# Patient Record
Sex: MALE | Race: WHITE | Employment: FULL TIME | URBAN - METROPOLITAN AREA
[De-identification: names, ages, dates, MRNs, and addresses within clinical notes are randomized per-mention and may not be internally consistent; named-entity substitution may affect disease eponyms.]

---

## 2021-07-15 ENCOUNTER — APPOINTMENT (OUTPATIENT)
Dept: NON INVASIVE DIAGNOSTICS | Age: 26
DRG: 287 | End: 2021-07-15
Attending: NURSE PRACTITIONER
Payer: COMMERCIAL

## 2021-07-15 ENCOUNTER — APPOINTMENT (OUTPATIENT)
Dept: CT IMAGING | Age: 26
DRG: 287 | End: 2021-07-15
Attending: EMERGENCY MEDICINE
Payer: COMMERCIAL

## 2021-07-15 ENCOUNTER — HOSPITAL ENCOUNTER (INPATIENT)
Age: 26
LOS: 1 days | Discharge: HOME | DRG: 287 | End: 2021-07-16
Attending: EMERGENCY MEDICINE | Admitting: INTERNAL MEDICINE
Payer: COMMERCIAL

## 2021-07-15 ENCOUNTER — APPOINTMENT (OUTPATIENT)
Dept: GENERAL RADIOLOGY | Age: 26
DRG: 287 | End: 2021-07-15
Attending: EMERGENCY MEDICINE
Payer: COMMERCIAL

## 2021-07-15 DIAGNOSIS — R07.9 CHEST PAIN, UNSPECIFIED TYPE: ICD-10-CM

## 2021-07-15 DIAGNOSIS — I21.4 NSTEMI (NON-ST ELEVATED MYOCARDIAL INFARCTION) (HCC): Primary | ICD-10-CM

## 2021-07-15 DIAGNOSIS — I40.1 ACUTE IDIOPATHIC MYOCARDITIS: ICD-10-CM

## 2021-07-15 PROBLEM — I10 HTN (HYPERTENSION): Status: ACTIVE | Noted: 2021-07-15

## 2021-07-15 PROBLEM — R51.9 HEAD ACHE: Status: ACTIVE | Noted: 2021-07-15

## 2021-07-15 PROBLEM — R77.8 ELEVATED TROPONIN: Status: ACTIVE | Noted: 2021-07-15

## 2021-07-15 LAB
ALBUMIN SERPL-MCNC: 3.8 G/DL (ref 3.5–5)
ALBUMIN/GLOB SERPL: 1.2 {RATIO} (ref 1.2–3.5)
ALP SERPL-CCNC: 56 U/L (ref 50–136)
ALT SERPL-CCNC: 51 U/L (ref 12–65)
ANION GAP SERPL CALC-SCNC: 4 MMOL/L (ref 7–16)
APTT PPP: 28.6 SEC (ref 24.1–35.1)
AST SERPL-CCNC: 36 U/L (ref 15–37)
ATRIAL RATE: 75 BPM
ATRIAL RATE: 86 BPM
BASOPHILS # BLD: 0 K/UL (ref 0–0.2)
BASOPHILS NFR BLD: 0 % (ref 0–2)
BILIRUB SERPL-MCNC: 0.9 MG/DL (ref 0.2–1.1)
BUN SERPL-MCNC: 18 MG/DL (ref 6–23)
CALCIUM SERPL-MCNC: 8.6 MG/DL (ref 8.3–10.4)
CALCULATED P AXIS, ECG09: 23 DEGREES
CALCULATED P AXIS, ECG09: 51 DEGREES
CALCULATED R AXIS, ECG10: 58 DEGREES
CALCULATED R AXIS, ECG10: 70 DEGREES
CALCULATED T AXIS, ECG11: 18 DEGREES
CALCULATED T AXIS, ECG11: 36 DEGREES
CHLORIDE SERPL-SCNC: 105 MMOL/L (ref 98–107)
CK SERPL-CCNC: 301 U/L (ref 21–215)
CO2 SERPL-SCNC: 33 MMOL/L (ref 21–32)
COVID-19 RAPID TEST, COVR: NOT DETECTED
CREAT SERPL-MCNC: 1.06 MG/DL (ref 0.8–1.5)
CRP SERPL HS-MCNC: 8.2 MG/L
D DIMER PPP FEU-MCNC: 0.38 UG/ML(FEU)
DIAGNOSIS, 93000: NORMAL
DIAGNOSIS, 93000: NORMAL
DIFFERENTIAL METHOD BLD: ABNORMAL
EOSINOPHIL # BLD: 0 K/UL (ref 0–0.8)
EOSINOPHIL NFR BLD: 1 % (ref 0.5–7.8)
ERYTHROCYTE [DISTWIDTH] IN BLOOD BY AUTOMATED COUNT: 12.1 % (ref 11.9–14.6)
ERYTHROCYTE [SEDIMENTATION RATE] IN BLOOD: 4 MM/HR (ref 0–15)
GLOBULIN SER CALC-MCNC: 3.3 G/DL (ref 2.3–3.5)
GLUCOSE SERPL-MCNC: 102 MG/DL (ref 65–100)
HCT VFR BLD AUTO: 41.1 % (ref 41.1–50.3)
HGB BLD-MCNC: 15 G/DL (ref 13.6–17.2)
IMM GRANULOCYTES # BLD AUTO: 0 K/UL (ref 0–0.5)
IMM GRANULOCYTES NFR BLD AUTO: 0 % (ref 0–5)
INR PPP: 1.1
LIPASE SERPL-CCNC: 119 U/L (ref 73–393)
LYMPHOCYTES # BLD: 1.2 K/UL (ref 0.5–4.6)
LYMPHOCYTES NFR BLD: 23 % (ref 13–44)
MCH RBC QN AUTO: 32.1 PG (ref 26.1–32.9)
MCHC RBC AUTO-ENTMCNC: 36.4 G/DL (ref 31.4–35)
MCV RBC AUTO: 88.2 FL (ref 79.6–97.8)
MONOCYTES # BLD: 1 K/UL (ref 0.1–1.3)
MONOCYTES NFR BLD: 20 % (ref 4–12)
NEUTS SEG # BLD: 3 K/UL (ref 1.7–8.2)
NEUTS SEG NFR BLD: 56 % (ref 43–78)
NRBC # BLD: 0 K/UL (ref 0–0.2)
P-R INTERVAL, ECG05: 144 MS
P-R INTERVAL, ECG05: 160 MS
PLATELET # BLD AUTO: 160 K/UL (ref 150–450)
PMV BLD AUTO: 9.5 FL (ref 9.4–12.3)
POTASSIUM SERPL-SCNC: 3.8 MMOL/L (ref 3.5–5.1)
PROT SERPL-MCNC: 7.1 G/DL (ref 6.3–8.2)
PROTHROMBIN TIME: 14.9 SEC (ref 12.5–14.7)
Q-T INTERVAL, ECG07: 338 MS
Q-T INTERVAL, ECG07: 356 MS
QRS DURATION, ECG06: 92 MS
QRS DURATION, ECG06: 96 MS
QTC CALCULATION (BEZET), ECG08: 397 MS
QTC CALCULATION (BEZET), ECG08: 404 MS
RBC # BLD AUTO: 4.66 M/UL (ref 4.23–5.6)
SARS-COV-2, COV2: NORMAL
SODIUM SERPL-SCNC: 142 MMOL/L (ref 136–145)
SOURCE, COVRS: NORMAL
TROPONIN-HIGH SENSITIVITY: 3943 PG/ML (ref 0–14)
TROPONIN-HIGH SENSITIVITY: 6519.9 PG/ML (ref 0–14)
TROPONIN-HIGH SENSITIVITY: 7050.7 PG/ML (ref 0–14)
VENTRICULAR RATE, ECG03: 75 BPM
VENTRICULAR RATE, ECG03: 86 BPM
WBC # BLD AUTO: 5.7 K/UL (ref 4.3–11.1)

## 2021-07-15 PROCEDURE — B2111ZZ FLUOROSCOPY OF MULTIPLE CORONARY ARTERIES USING LOW OSMOLAR CONTRAST: ICD-10-PCS | Performed by: INTERNAL MEDICINE

## 2021-07-15 PROCEDURE — 93458 L HRT ARTERY/VENTRICLE ANGIO: CPT | Performed by: INTERNAL MEDICINE

## 2021-07-15 PROCEDURE — 85379 FIBRIN DEGRADATION QUANT: CPT

## 2021-07-15 PROCEDURE — 99152 MOD SED SAME PHYS/QHP 5/>YRS: CPT | Performed by: INTERNAL MEDICINE

## 2021-07-15 PROCEDURE — 76210000027 HC REC RM PH II 3.5 TO 4 HR: Performed by: INTERNAL MEDICINE

## 2021-07-15 PROCEDURE — 86141 C-REACTIVE PROTEIN HS: CPT

## 2021-07-15 PROCEDURE — 77030016699 HC CATH ANGI DX INFN1 CARD -A: Performed by: INTERNAL MEDICINE

## 2021-07-15 PROCEDURE — 99223 1ST HOSP IP/OBS HIGH 75: CPT | Performed by: INTERNAL MEDICINE

## 2021-07-15 PROCEDURE — 74011250636 HC RX REV CODE- 250/636: Performed by: NURSE PRACTITIONER

## 2021-07-15 PROCEDURE — 4A023N7 MEASUREMENT OF CARDIAC SAMPLING AND PRESSURE, LEFT HEART, PERCUTANEOUS APPROACH: ICD-10-PCS | Performed by: INTERNAL MEDICINE

## 2021-07-15 PROCEDURE — 82550 ASSAY OF CK (CPK): CPT

## 2021-07-15 PROCEDURE — 99285 EMERGENCY DEPT VISIT HI MDM: CPT

## 2021-07-15 PROCEDURE — 74011250637 HC RX REV CODE- 250/637: Performed by: NURSE PRACTITIONER

## 2021-07-15 PROCEDURE — C1894 INTRO/SHEATH, NON-LASER: HCPCS | Performed by: INTERNAL MEDICINE

## 2021-07-15 PROCEDURE — 74011000636 HC RX REV CODE- 636: Performed by: INTERNAL MEDICINE

## 2021-07-15 PROCEDURE — 71046 X-RAY EXAM CHEST 2 VIEWS: CPT

## 2021-07-15 PROCEDURE — B2151ZZ FLUOROSCOPY OF LEFT HEART USING LOW OSMOLAR CONTRAST: ICD-10-PCS | Performed by: INTERNAL MEDICINE

## 2021-07-15 PROCEDURE — 80053 COMPREHEN METABOLIC PANEL: CPT

## 2021-07-15 PROCEDURE — 85730 THROMBOPLASTIN TIME PARTIAL: CPT

## 2021-07-15 PROCEDURE — 77030015766: Performed by: INTERNAL MEDICINE

## 2021-07-15 PROCEDURE — 74011000250 HC RX REV CODE- 250: Performed by: INTERNAL MEDICINE

## 2021-07-15 PROCEDURE — 93005 ELECTROCARDIOGRAM TRACING: CPT | Performed by: EMERGENCY MEDICINE

## 2021-07-15 PROCEDURE — 87635 SARS-COV-2 COVID-19 AMP PRB: CPT

## 2021-07-15 PROCEDURE — C1769 GUIDE WIRE: HCPCS | Performed by: INTERNAL MEDICINE

## 2021-07-15 PROCEDURE — 70450 CT HEAD/BRAIN W/O DYE: CPT

## 2021-07-15 PROCEDURE — 84484 ASSAY OF TROPONIN QUANT: CPT

## 2021-07-15 PROCEDURE — 74011250636 HC RX REV CODE- 250/636: Performed by: INTERNAL MEDICINE

## 2021-07-15 PROCEDURE — 85610 PROTHROMBIN TIME: CPT

## 2021-07-15 PROCEDURE — 74011250637 HC RX REV CODE- 250/637: Performed by: EMERGENCY MEDICINE

## 2021-07-15 PROCEDURE — 85652 RBC SED RATE AUTOMATED: CPT

## 2021-07-15 PROCEDURE — 83690 ASSAY OF LIPASE: CPT

## 2021-07-15 PROCEDURE — 65660000000 HC RM CCU STEPDOWN

## 2021-07-15 PROCEDURE — U0005 INFEC AGEN DETEC AMPLI PROBE: HCPCS

## 2021-07-15 PROCEDURE — 85025 COMPLETE CBC W/AUTO DIFF WBC: CPT

## 2021-07-15 PROCEDURE — C8929 TTE W OR WO FOL WCON,DOPPLER: HCPCS

## 2021-07-15 PROCEDURE — 74011000250 HC RX REV CODE- 250: Performed by: EMERGENCY MEDICINE

## 2021-07-15 RX ORDER — METOPROLOL TARTRATE 25 MG/1
25 TABLET, FILM COATED ORAL EVERY 12 HOURS
Status: DISCONTINUED | OUTPATIENT
Start: 2021-07-15 | End: 2021-07-16 | Stop reason: HOSPADM

## 2021-07-15 RX ORDER — IBUPROFEN 600 MG/1
600 TABLET ORAL 3 TIMES DAILY
Status: DISCONTINUED | OUTPATIENT
Start: 2021-07-15 | End: 2021-07-16 | Stop reason: HOSPADM

## 2021-07-15 RX ORDER — SODIUM CHLORIDE 0.9 % (FLUSH) 0.9 %
5-40 SYRINGE (ML) INJECTION AS NEEDED
Status: DISCONTINUED | OUTPATIENT
Start: 2021-07-15 | End: 2021-07-16 | Stop reason: HOSPADM

## 2021-07-15 RX ORDER — MORPHINE SULFATE 4 MG/ML
4 INJECTION INTRAVENOUS ONCE
Status: DISCONTINUED | OUTPATIENT
Start: 2021-07-15 | End: 2021-07-15

## 2021-07-15 RX ORDER — ONDANSETRON 2 MG/ML
4 INJECTION INTRAMUSCULAR; INTRAVENOUS
Status: DISCONTINUED | OUTPATIENT
Start: 2021-07-15 | End: 2021-07-15

## 2021-07-15 RX ORDER — ACETAMINOPHEN 500 MG
1000 TABLET ORAL
Status: DISCONTINUED | OUTPATIENT
Start: 2021-07-15 | End: 2021-07-15

## 2021-07-15 RX ORDER — LIDOCAINE HYDROCHLORIDE 10 MG/ML
INJECTION INFILTRATION; PERINEURAL AS NEEDED
Status: DISCONTINUED | OUTPATIENT
Start: 2021-07-15 | End: 2021-07-15 | Stop reason: HOSPADM

## 2021-07-15 RX ORDER — GUAIFENESIN 100 MG/5ML
81 LIQUID (ML) ORAL DAILY
Status: DISCONTINUED | OUTPATIENT
Start: 2021-07-15 | End: 2021-07-16 | Stop reason: HOSPADM

## 2021-07-15 RX ORDER — SODIUM CHLORIDE 0.9 % (FLUSH) 0.9 %
5-40 SYRINGE (ML) INJECTION EVERY 8 HOURS
Status: DISCONTINUED | OUTPATIENT
Start: 2021-07-15 | End: 2021-07-16 | Stop reason: HOSPADM

## 2021-07-15 RX ORDER — MORPHINE SULFATE 2 MG/ML
2 INJECTION, SOLUTION INTRAMUSCULAR; INTRAVENOUS
Status: DISCONTINUED | OUTPATIENT
Start: 2021-07-15 | End: 2021-07-16 | Stop reason: HOSPADM

## 2021-07-15 RX ORDER — GUAIFENESIN 100 MG/5ML
324 LIQUID (ML) ORAL
Status: COMPLETED | OUTPATIENT
Start: 2021-07-15 | End: 2021-07-15

## 2021-07-15 RX ORDER — MAG HYDROX/ALUMINUM HYD/SIMETH 200-200-20
30 SUSPENSION, ORAL (FINAL DOSE FORM) ORAL
Status: COMPLETED | OUTPATIENT
Start: 2021-07-15 | End: 2021-07-15

## 2021-07-15 RX ORDER — NITROGLYCERIN 0.4 MG/1
0.4 TABLET SUBLINGUAL
Status: DISCONTINUED | OUTPATIENT
Start: 2021-07-15 | End: 2021-07-16 | Stop reason: HOSPADM

## 2021-07-15 RX ORDER — HEPARIN SODIUM 5000 [USP'U]/ML
60 INJECTION, SOLUTION INTRAVENOUS; SUBCUTANEOUS ONCE
Status: DISCONTINUED | OUTPATIENT
Start: 2021-07-15 | End: 2021-07-15

## 2021-07-15 RX ORDER — HEPARIN SODIUM 5000 [USP'U]/100ML
12-25 INJECTION, SOLUTION INTRAVENOUS
Status: DISCONTINUED | OUTPATIENT
Start: 2021-07-15 | End: 2021-07-15

## 2021-07-15 RX ORDER — LIDOCAINE HYDROCHLORIDE 20 MG/ML
15 SOLUTION OROPHARYNGEAL
Status: COMPLETED | OUTPATIENT
Start: 2021-07-15 | End: 2021-07-15

## 2021-07-15 RX ORDER — MIDAZOLAM HYDROCHLORIDE 1 MG/ML
INJECTION, SOLUTION INTRAMUSCULAR; INTRAVENOUS AS NEEDED
Status: DISCONTINUED | OUTPATIENT
Start: 2021-07-15 | End: 2021-07-15 | Stop reason: HOSPADM

## 2021-07-15 RX ADMIN — ASPIRIN 324 MG: 81 TABLET, CHEWABLE ORAL at 09:11

## 2021-07-15 RX ADMIN — LIDOCAINE HYDROCHLORIDE 15 ML: 20 SOLUTION ORAL; TOPICAL at 09:11

## 2021-07-15 RX ADMIN — ALUMINUM HYDROXIDE, MAGNESIUM HYDROXIDE, AND SIMETHICONE 30 ML: 200; 200; 20 SUSPENSION ORAL at 09:11

## 2021-07-15 RX ADMIN — Medication 10 ML: at 20:27

## 2021-07-15 RX ADMIN — IBUPROFEN 600 MG: 600 TABLET ORAL at 19:10

## 2021-07-15 RX ADMIN — IBUPROFEN 600 MG: 600 TABLET ORAL at 23:26

## 2021-07-15 RX ADMIN — MORPHINE SULFATE 2 MG: 2 INJECTION, SOLUTION INTRAMUSCULAR; INTRAVENOUS at 20:24

## 2021-07-15 RX ADMIN — PERFLUTREN 1 ML: 6.52 INJECTION, SUSPENSION INTRAVENOUS at 13:57

## 2021-07-15 RX ADMIN — METOPROLOL TARTRATE 25 MG: 25 TABLET, FILM COATED ORAL at 20:24

## 2021-07-15 RX ADMIN — Medication 10 ML: at 21:19

## 2021-07-15 RX ADMIN — Medication 10 ML: at 15:42

## 2021-07-15 NOTE — PROGRESS NOTES
TRANSFER - IN REPORT:    Verbal report received from Laurence RN(name) on [facility]  being received from PAR(unit) for routine progression of care      Report consisted of patients Situation, Background, Assessment and   Recommendations(SBAR). Information from the following report(s) SBAR, Procedure Summary and Cardiac Rhythm . was reviewed with the receiving nurse. Opportunity for questions and clarification was provided. Assessment completed upon patients arrival to unit and care assumed. Telemetry monitor applied, bed low and locked, side rails x2. Patient has been oriented to room and instructed to use call light for assistance. Dual skin assessment completed with secondary RN which reveals the following: sacrum and heels intact and free of redness. Right radial cath site noted. C/D/I with no hematoma present. Tegaderm and gauze intact.

## 2021-07-15 NOTE — ROUTINE PROCESS
Bedside and Verbal shift change report given to self (oncoming nurse) by Wilder Rojas RN (offgoing nurse). Report included the following information SBAR, Kardex, ED Summary, Procedure Summary, MAR and Recent Results.

## 2021-07-15 NOTE — H&P
[facility] CARDIOLOGY History &Physical                 Primary Cardiologist: None    Primary Care Physician: PCP    Admitting Physician: Dr Pam Klein:     Patient is a 23 y.o. male with a hx of significant medical history who comes to the emergency department with complaints of of severe headache and chest pain started last night. The chest pain described as sharp pain sometimes moves all the way up to his head and the back down into his chest.  At times this pain will go to his jaw and woke him up around 7 AM this morning. Patient was having a dream where he had significant discomfort as well. Currently denies shortness of breath, cough, hemoptysis, fever, chills, abdominal pain, dizziness, weakness, diaphoresis, flank pain, back pain, history of CAD, history of PE DVT, smoking cigarettes, or illicit drug or alcohol use. Patient also I believe work early last night from a severe migraine which would not go away. He has never had invasive cardiac work-up in the past and for set of high since troponin enzymes were close to 4000. Review of other lab work showed elevated CO2 of 33 glucose 102 creatinine 1.06, no other significant abnormalities. Further lab work was ordered. Patient heparin drip was held in the ER to be sent to CT to rule out significant head bleed. Recent Cardiac Synopsis w/ Labs  LHC/NST: Pending  Echo: Pending  EKG: Normal sinus rhythm with bi atrial P waves  PPM Interrogation:   No past medical history on file. No past surgical history on file. No Known Allergies  Social History     Tobacco Use    Smoking status: Not on file   Substance Use Topics    Alcohol use: Not on file      FH: No family history on file. Review of Systems   Constitutional: Positive for malaise/fatigue. Cardiovascular: Positive for chest pain. Respiratory: Negative for shortness of breath. Gastrointestinal: Positive for nausea. Negative for vomiting. Neurological: Positive for headaches. Objective:       Visit Vitals  BP (!) 150/88   Pulse 99   Temp 98.2 °F (36.8 °C)   Resp 16   Ht 6' (1.829 m)   Wt 104.3 kg (230 lb)   SpO2 98%   BMI 31.19 kg/m²       Last 3 Recorded Weights in this Encounter    07/15/21 0858   Weight: 104.3 kg (230 lb)       No intake/output data recorded. No intake/output data recorded. Physical Exam:  General: Well Developed, Well Nourished, No Acute Distress  HEENT: pupils equal and round, no abnormalities noted  Neck: supple, no JVD, no carotid bruits  Heart: S1S2 with RRR without murmurs or gallops  Lungs: Clear throughout auscultation bilaterally without adventitious sounds  Abd: soft, nontender, nondistended, with good bowel sounds  Ext: warm, no edema, calves supple/nontender, pulses 2+ bilaterally  Skin: warm and dry  Psychiatric: Normal mood and affect  Neurologic: Alert and oriented X 3    Data Review:   Recent Labs     07/15/21  0900      K 3.8   BUN 18   CREA 1.06   *   WBC 5.7   HGB 15.0   HCT 41.1          CXR Results  (Last 48 hours)               07/15/21 0954  XR CHEST PA LAT Final result    Impression:  No acute findings in the chest           Narrative:  EXAM: XR CHEST PA LAT       INDICATION: CP.       COMPARISON: None. PA and lateral views of the chest were obtained. FINDINGS:    Support Devices:   *  None. Cardiac Silhouette: Within normal limits in size. Mediastinum: Normal mediastinal contours. Lungs: No airspace consolidation. No pneumothorax or sizable pleural effusion. Upper Abdomen: Normal.       Miscellaneous: No lytic or blastic lesions. Assessment/Plan:   Principal Problem:    Chest pain (7/15/2021) we will plan for left heart cath and start heparin drip after patient has had a CT of the head. Concerning for acute myocardial infarction versus myocarditis.   Will start heparin drip, ASA, beta-blocker, daily BMP, trend troponins, CK, sed rate, and check echocardiogram.  Further recommendations pending results of left heart cath and attending recommendations. Will get lipid panel in the morning and check UDS. Active Problems:    Elevated troponin (7/15/2021) see above plan on heparin drip after rule outs of head CT with complaints of headache and slightly hypertensive. Head ache (7/15/2021) as noted above plan CT, Tylenol for pain. HTN (hypertension) (7/15/2021) likely secondary to chest pain and headache. We will continue to trend. Further recommendations pending clinical course and attending recommendations.             Shala Diop NP  7/15/2021  10:54 AM

## 2021-07-15 NOTE — ED TRIAGE NOTES
Pt arrives via pov from home for complaint of chest pain that started at 0700. Pt states CP woke him up from sleep. Pt confirms nausea without vomiting, SOB. Pt denies diaphoresis, lightheadedness or dizziness. Pt with no cardiac history.

## 2021-07-15 NOTE — PROGRESS NOTES
TRANSFER - IN REPORT:    Verbal report received from [employer] on [employer]  being received from Cath Lab for routine progression of care      Report consisted of patients Situation, Background, Assessment and   Recommendations(SBAR). Information from the following report(s) SBAR was reviewed with the receiving nurse. Opportunity for questions and clarification was provided. Assessment completed upon patients arrival to unit and care assumed.

## 2021-07-15 NOTE — Clinical Note
TRANSFER - OUT REPORT:     Verbal report given to: [facility] RN. Report consisted of patient's Situation, Background, Assessment and   Recommendations(SBAR). Opportunity for questions and clarification was provided. Patient transported with a Cardiac Cath Tech / Patient Care Tech. Patient transported to: [address] [place] [address].

## 2021-07-15 NOTE — PROGRESS NOTES
Pt complain on numbness and tingly feeling in right arm. Ana Granda made aware and assessed patient. NIH 0. Will continue to monitor patient closely.

## 2021-07-15 NOTE — Clinical Note
TRANSFER - IN REPORT:     Verbal report received from: CPRU RN. Report consisted of patient's Situation, Background, Assessment and   Recommendations(SBAR). Opportunity for questions and clarification was provided. Assessment completed upon patient's arrival to unit and care assumed. Patient transported with a Cardiac Cath Tech / Patient Care Tech.

## 2021-07-15 NOTE — PROGRESS NOTES
TRANSFER - OUT REPORT:  C by Dr. Bart Byrnes   Right radial access  No interventions  Right wrist TR band with 12ml  Versed 2mg IV  Access site soft, clean, dry, and intact; with no signs of bleeding or hematoma  Pt. Tolerated procedure    Verbal report given to Laurence(name) petty Erickson  being transferred to [facility](unit) for routine progression of care       Report consisted of patients Situation, Background, Assessment and   Recommendations(SBAR). Information from the following report(s) Procedure Summary and MAR was reviewed with the receiving nurse. Lines:   Peripheral IV Left Antecubital (Active)       Peripheral IV Right Antecubital (Active)        Opportunity for questions and clarification was provided.       Patient transported with:   Registered Nurse

## 2021-07-15 NOTE — ED PROVIDER NOTES
58-year-old male with no pertinent past medical history presents with complaint of sharp substernal chest pressure with radiation to right and left chest as well as jaw that woke him up around 7 AM.  Patient states that he was [place] a dream where there was pressure on his chest and woke up with significant discomfort\". Denies shortness of breath, cough, hemoptysis, fever, chills, abdominal pain, dizziness, weakness, diaphoresis, flank pain. Denies history of CAD. Denies history of PE or DVT. Denies smoking cigarettes. Denies illicit drug use or alcohol use. States that he had to leave work last night due to migraine. The history is provided by the patient. No  was used. Chest Pain (Angina)   This is a new problem. The current episode started 1 to 2 hours ago. The problem has not changed since onset. The problem occurs rarely. The pain is associated with normal activity. The pain is present in the substernal region. The pain is at a severity of 4/10. The pain is moderate. The quality of the pain is described as pressure-like and burning. Pertinent negatives include no abdominal pain, no back pain, no cough, no diaphoresis, no dizziness, no exertional chest pressure, no fever, no headaches, no nausea, no palpitations, no shortness of breath, no vomiting and no weakness. He has tried nothing for the symptoms. The treatment provided no relief. Risk factors include male gender. No past medical history on file. No past surgical history on file. No family history on file.     Social History     Socioeconomic History    Marital status: LIFE PARTNER     Spouse name: Not on file    Number of children: Not on file    Years of education: Not on file    Highest education level: Not on file   Occupational History    Not on file   Tobacco Use    Smoking status: Not on file   Substance and Sexual Activity    Alcohol use: Not on file    Drug use: Not on file    Sexual activity: Not on file   Other Topics Concern    Not on file   Social History Narrative    Not on file     Social Determinants of Health     Financial Resource Strain:     Difficulty of Paying Living Expenses:    Food Insecurity:     Worried About Running Out of Food in the Last Year:     [address] in the Last Year:    Transportation Needs:     Lack of Transportation (Medical):  Lack of Transportation (Non-Medical):    Physical Activity:     Days of Exercise per Week:     Minutes of Exercise per Session:    Stress:     Feeling of Stress :    Social Connections:     Frequency of Communication with Friends and Family:     Frequency of Social Gatherings with Friends and Family:     Attends [employer] Services:     Active Member of Clubs or Organizations:     Attends Club or Organization Meetings:     Marital Status:    Intimate Partner Violence:     Fear of Current or Ex-Partner:     Emotionally Abused:     Physically Abused:     Sexually Abused: ALLERGIES: Patient has no known allergies. Review of Systems   Constitutional: Negative for chills, diaphoresis, fatigue and fever. HENT: Negative for congestion and rhinorrhea. Respiratory: Negative for cough and shortness of breath. Cardiovascular: Positive for chest pain. Negative for palpitations. Gastrointestinal: Negative for abdominal pain, diarrhea, nausea and vomiting. Genitourinary: Negative for dysuria, flank pain and hematuria. Musculoskeletal: Negative for back pain and myalgias. Skin: Negative for color change and rash. Neurological: Negative for dizziness, syncope, weakness, light-headedness and headaches. Vitals:    07/15/21 0922 07/15/21 0959 07/15/21 1001 07/15/21 1002   BP:  137/75 (!) 141/86 (!) 147/68   Pulse:  92 84 80   Resp:       Temp:       SpO2: 97% 95% 96% 96%   Weight:       Height:                Physical Exam  Vitals and nursing note reviewed.    Constitutional:       Appearance: He is well-developed. HENT:      Head: Normocephalic. Eyes:      Extraocular Movements: Extraocular movements intact. Pupils: Pupils are equal, round, and reactive to light. Comments: No nystagmus. Cardiovascular:      Rate and Rhythm: Normal rate and regular rhythm. Heart sounds: Normal heart sounds. Comments: Pulses 2+ and equal throughout. Pulmonary:      Effort: Pulmonary effort is normal.      Breath sounds: Normal breath sounds. Comments: CTAB. Abdominal:      General: Bowel sounds are normal.      Palpations: Abdomen is soft. Tenderness: There is no abdominal tenderness. Comments: Soft, NTND. No rebound or guarding. No CVAT. Musculoskeletal:         General: Normal range of motion. Cervical back: Normal range of motion. Comments: No LE edema. No calf TTP. Skin:     General: Skin is warm. Capillary Refill: Capillary refill takes less than 2 seconds. Findings: No erythema. Neurological:      General: No focal deficit present. Mental Status: He is alert and oriented to person, place, and time. Motor: No weakness. Comments: No focal deficits. Strength 5 out of 5 throughout. MDM  Number of Diagnoses or Management Options  Chest pain, unspecified type: new and requires workup  NSTEMI (non-ST elevated myocardial infarction) [facility]): new and requires workup  Diagnosis management comments: GI cocktail, aspirin ordered. EKG with normal sinus rhythm. No ischemic changes noted. Basic labs and chest x-ray pending.  ===================================================  Initial troponin noted to be elevated at 3943. Cardiology consulted. Coags added on. Chest x-ray with no wide mediastinum. Pulses 2+ and equal throughout. Blood pressures between bilateral UEs equal.  Spoke w/ Dr. Rachael Zhou who recommends starting heparin at this time. States that they will see patient at bedside.   Patient does state that he did have significant headache this morning. Will obtain CT head prior to administration of heparin. Cardiology to take to Cath Lab after returning from CT. Amount and/or Complexity of Data Reviewed  Clinical lab tests: ordered and reviewed  Tests in the radiology section of CPT®: ordered and reviewed  Tests in the medicine section of CPT®: ordered and reviewed  Review and summarize past medical records: yes  Discuss the patient with other providers: yes  Independent visualization of images, tracings, or specimens: yes    Risk of Complications, Morbidity, and/or Mortality  Presenting problems: high  Diagnostic procedures: high  Management options: high    Patient Progress  Patient progress: stable    ED Course as of Jul 15 1018   Thu Jul 15, 2021   0949 Troponin-High Sensitivity(!!): 3,943.0 [DF]   1017 CXR IMPRESSION:   No acute findings in the chest    [DF]      ED Course User Index  [DF] Mayra Faria MD       EKG    Date/Time: 7/15/2021 8:55 AM  Performed by: Mayra Faria MD  Authorized by: Mayra Faria MD     ECG reviewed by ED Physician in the absence of a cardiologist: yes    Previous ECG:     Previous ECG:  Unavailable  Rate:     ECG rate:  86    ECG rate assessment: normal    Rhythm:     Rhythm: sinus rhythm    Ectopy:     Ectopy: none    QRS:     QRS axis:  Normal    QRS intervals:  Normal  Conduction:     Conduction: normal    ST segments:     ST segments:  Normal  T waves:     T waves: normal      Critical Care  Performed by: Mayra Faria MD  Authorized by:  Mayra Faria MD     Critical care provider statement:     Critical care time (minutes):  35    Critical care time was exclusive of:  Separately billable procedures and treating other patients    Critical care was necessary to treat or prevent imminent or life-threatening deterioration of the following conditions:  Cardiac failure    Critical care was time spent personally by me on the following activities:  Blood draw for specimens, development of treatment plan with patient or surrogate, discussions with consultants, evaluation of patient's response to treatment, examination of patient, obtaining history from patient or surrogate, ordering and performing treatments and interventions, ordering and review of laboratory studies, ordering and review of radiographic studies, pulse oximetry, re-evaluation of patient's condition and review of old charts    I assumed direction of critical care for this patient from another provider in my specialty: yes          Results Include:    Recent Results (from the past 24 hour(s))   EKG, 12 LEAD, INITIAL    Collection Time: 07/15/21  8:50 AM   Result Value Ref Range    Ventricular Rate 86 BPM    Atrial Rate 86 BPM    P-R Interval 144 ms    QRS Duration 96 ms    Q-T Interval 338 ms    QTC Calculation (Bezet) 404 ms    Calculated P Axis 51 degrees    Calculated R Axis 70 degrees    Calculated T Axis 36 degrees    Diagnosis       !! AGE AND GENDER SPECIFIC ECG ANALYSIS !! Normal sinus rhythm  Normal ECG  No previous ECGs available     CBC WITH AUTOMATED DIFF    Collection Time: 07/15/21  9:00 AM   Result Value Ref Range    WBC 5.7 4.3 - 11.1 K/uL    RBC 4.66 4.23 - 5.6 M/uL    HGB 15.0 13.6 - 17.2 g/dL    HCT 41.1 41.1 - 50.3 %    MCV 88.2 79.6 - 97.8 FL    MCH 32.1 26.1 - 32.9 PG    MCHC 36.4 (H) 31.4 - 35.0 g/dL    RDW 12.1 11.9 - 14.6 %    PLATELET 118 790 - 547 K/uL    MPV 9.5 9.4 - 12.3 FL    ABSOLUTE NRBC 0.00 0.0 - 0.2 K/uL    DF AUTOMATED      NEUTROPHILS 56 43 - 78 %    LYMPHOCYTES 23 13 - 44 %    MONOCYTES 20 (H) 4.0 - 12.0 %    EOSINOPHILS 1 0.5 - 7.8 %    BASOPHILS 0 0.0 - 2.0 %    IMMATURE GRANULOCYTES 0 0.0 - 5.0 %    ABS. NEUTROPHILS 3.0 1.7 - 8.2 K/UL    ABS. LYMPHOCYTES 1.2 0.5 - 4.6 K/UL    ABS. MONOCYTES 1.0 0.1 - 1.3 K/UL    ABS. EOSINOPHILS 0.0 0.0 - 0.8 K/UL    ABS. BASOPHILS 0.0 0.0 - 0.2 K/UL    ABS. IMM.  GRANS. 0.0 0.0 - 0.5 K/UL   METABOLIC PANEL, COMPREHENSIVE    Collection Time: 07/15/21  9:00 AM   Result Value Ref Range    Sodium 142 136 - 145 mmol/L    Potassium 3.8 3.5 - 5.1 mmol/L    Chloride 105 98 - 107 mmol/L    CO2 33 (H) 21 - 32 mmol/L    Anion gap 4 (L) 7 - 16 mmol/L    Glucose 102 (H) 65 - 100 mg/dL    BUN 18 6 - 23 MG/DL    Creatinine 1.06 0.8 - 1.5 MG/DL    GFR est AA >60 >60 ml/min/1.73m2    GFR est non-AA >60 >60 ml/min/1.73m2    Calcium 8.6 8.3 - 10.4 MG/DL    Bilirubin, total 0.9 0.2 - 1.1 MG/DL    ALT (SGPT) 51 12 - 65 U/L    AST (SGOT) 36 15 - 37 U/L    Alk. phosphatase 56 50 - 136 U/L    Protein, total 7.1 6.3 - 8.2 g/dL    Albumin 3.8 3.5 - 5.0 g/dL    Globulin 3.3 2.3 - 3.5 g/dL    A-G Ratio 1.2 1.2 - 3.5     LIPASE    Collection Time: 07/15/21  9:00 AM   Result Value Ref Range    Lipase 119 73 - 393 U/L   TROPONIN-HIGH SENSITIVITY    Collection Time: 07/15/21  9:00 AM   Result Value Ref Range    Troponin-High Sensitivity 3,943.0 (HH) 0 - 14 pg/mL           Khurram Allen MD; 7/15/2021 @8:55 AM Voice dictation software was used during the making of this note. This software is not perfect and grammatical and other typographical errors may be present.   This note has not been proofread for errors.  ===================================================================

## 2021-07-15 NOTE — Clinical Note
Contrast Dose Calculator:   Patient's age: 23.   Patient's sex: Male. Patient weight (kg) = 104.3. Creatinine level (mg/dL) = 1.06.   Creatinine clearance (mL/min): 157. Contrast concentration (mg/mL) = 370. MACD = 300 mL. Max Contrast dose per Creatinine Cl calculator = 300 mL.

## 2021-07-16 VITALS
TEMPERATURE: 97.9 F | RESPIRATION RATE: 18 BRPM | HEIGHT: 72 IN | OXYGEN SATURATION: 98 % | SYSTOLIC BLOOD PRESSURE: 123 MMHG | DIASTOLIC BLOOD PRESSURE: 80 MMHG | BODY MASS INDEX: 31.14 KG/M2 | WEIGHT: 229.9 LBS | HEART RATE: 86 BPM

## 2021-07-16 LAB
AMPHET UR QL SCN: NEGATIVE
ANION GAP SERPL CALC-SCNC: 2 MMOL/L (ref 7–16)
BARBITURATES UR QL SCN: NEGATIVE
BASOPHILS # BLD: 0 K/UL (ref 0–0.2)
BASOPHILS NFR BLD: 1 % (ref 0–2)
BENZODIAZ UR QL: POSITIVE
BUN SERPL-MCNC: 14 MG/DL (ref 6–23)
CALCIUM SERPL-MCNC: 8.8 MG/DL (ref 8.3–10.4)
CANNABINOIDS UR QL SCN: NEGATIVE
CHLORIDE SERPL-SCNC: 105 MMOL/L (ref 98–107)
CHOLEST SERPL-MCNC: 199 MG/DL
CO2 SERPL-SCNC: 32 MMOL/L (ref 21–32)
COCAINE UR QL SCN: NEGATIVE
CREAT SERPL-MCNC: 0.99 MG/DL (ref 0.8–1.5)
DIFFERENTIAL METHOD BLD: ABNORMAL
ECHO AO ASC DIAM: 2.95 CM
ECHO AO ROOT DIAM: 2.82 CM
ECHO AV AREA PEAK VELOCITY: 2.74 CM2
ECHO AV AREA/BSA PEAK VELOCITY: 1.2 CM2/M2
ECHO AV PEAK GRADIENT: 7.3 MMHG
ECHO AV PEAK VELOCITY: 135.1 CM/S
ECHO LA MAJOR AXIS: 2.58 CM
ECHO LA MINOR AXIS: 1.14 CM
ECHO LV E' LATERAL VELOCITY: 13.57 CM/S
ECHO LV E' SEPTAL VELOCITY: 12.23 CM/S
ECHO LV INTERNAL DIMENSION DIASTOLIC: 4.97 CM (ref 4.2–5.9)
ECHO LV INTERNAL DIMENSION SYSTOLIC: 4.02 CM
ECHO LV IVSD: 1.09 CM (ref 0.6–1)
ECHO LV MASS 2D: 207.7 G (ref 88–224)
ECHO LV MASS INDEX 2D: 91.9 G/M2 (ref 49–115)
ECHO LV POSTERIOR WALL DIASTOLIC: 1.13 CM (ref 0.6–1)
ECHO LVOT DIAM: 2.18 CM
ECHO LVOT PEAK GRADIENT: 3.95 MMHG
ECHO LVOT PEAK VELOCITY: 99.32 CM/S
ECHO MV A VELOCITY: 54.57 CM/S
ECHO MV AREA PHT: 4.96 CM2
ECHO MV E DECELERATION TIME (DT): 152.84 MS
ECHO MV E VELOCITY: 69.79 CM/S
ECHO MV E/A RATIO: 1.28
ECHO MV E/E' LATERAL: 5.14
ECHO MV E/E' RATIO (AVERAGED): 5.42
ECHO MV E/E' SEPTAL: 5.71
ECHO MV PRESSURE HALF TIME (PHT): 44.32 MS
ECHO RV INTERNAL DIMENSION: 2.12 CM
ECHO RV TAPSE: 1.82 CM (ref 1.5–2)
EOSINOPHIL # BLD: 0.1 K/UL (ref 0–0.8)
EOSINOPHIL NFR BLD: 1 % (ref 0.5–7.8)
ERYTHROCYTE [DISTWIDTH] IN BLOOD BY AUTOMATED COUNT: 12.2 % (ref 11.9–14.6)
GLUCOSE SERPL-MCNC: 96 MG/DL (ref 65–100)
HCT VFR BLD AUTO: 45.7 % (ref 41.1–50.3)
HDLC SERPL-MCNC: 41 MG/DL (ref 40–60)
HDLC SERPL: 4.9 {RATIO}
HGB BLD-MCNC: 15.6 G/DL (ref 13.6–17.2)
IMM GRANULOCYTES # BLD AUTO: 0 K/UL (ref 0–0.5)
IMM GRANULOCYTES NFR BLD AUTO: 0 % (ref 0–5)
LDLC SERPL CALC-MCNC: 110.4 MG/DL
LYMPHOCYTES # BLD: 2 K/UL (ref 0.5–4.6)
LYMPHOCYTES NFR BLD: 30 % (ref 13–44)
MCH RBC QN AUTO: 31 PG (ref 26.1–32.9)
MCHC RBC AUTO-ENTMCNC: 34.1 G/DL (ref 31.4–35)
MCV RBC AUTO: 90.7 FL (ref 79.6–97.8)
METHADONE UR QL: NEGATIVE
MONOCYTES # BLD: 1 K/UL (ref 0.1–1.3)
MONOCYTES NFR BLD: 15 % (ref 4–12)
NEUTS SEG # BLD: 3.4 K/UL (ref 1.7–8.2)
NEUTS SEG NFR BLD: 53 % (ref 43–78)
NRBC # BLD: 0 K/UL (ref 0–0.2)
OPIATES UR QL: POSITIVE
PCP UR QL: NEGATIVE
PLATELET # BLD AUTO: 144 K/UL (ref 150–450)
PMV BLD AUTO: 10.3 FL (ref 9.4–12.3)
POTASSIUM SERPL-SCNC: 4.4 MMOL/L (ref 3.5–5.1)
RBC # BLD AUTO: 5.04 M/UL (ref 4.23–5.6)
SARS-COV-2, COV2: NOT DETECTED
SODIUM SERPL-SCNC: 139 MMOL/L (ref 138–145)
SPECIMEN SOURCE, FCOV2M: NORMAL
TRIGL SERPL-MCNC: 238 MG/DL (ref 35–150)
VLDLC SERPL CALC-MCNC: 47.6 MG/DL (ref 6–23)
WBC # BLD AUTO: 6.5 K/UL (ref 4.3–11.1)

## 2021-07-16 PROCEDURE — 74011250637 HC RX REV CODE- 250/637: Performed by: NURSE PRACTITIONER

## 2021-07-16 PROCEDURE — 80061 LIPID PANEL: CPT

## 2021-07-16 PROCEDURE — 99232 SBSQ HOSP IP/OBS MODERATE 35: CPT | Performed by: INTERNAL MEDICINE

## 2021-07-16 PROCEDURE — 85025 COMPLETE CBC W/AUTO DIFF WBC: CPT

## 2021-07-16 PROCEDURE — 80048 BASIC METABOLIC PNL TOTAL CA: CPT

## 2021-07-16 PROCEDURE — 36415 COLL VENOUS BLD VENIPUNCTURE: CPT

## 2021-07-16 PROCEDURE — 2709999900 HC NON-CHARGEABLE SUPPLY

## 2021-07-16 PROCEDURE — 80307 DRUG TEST PRSMV CHEM ANLYZR: CPT

## 2021-07-16 RX ORDER — METOPROLOL TARTRATE 25 MG/1
25 TABLET, FILM COATED ORAL EVERY 12 HOURS
Qty: 60 TABLET | Refills: 3 | Status: SHIPPED | OUTPATIENT
Start: 2021-07-16 | End: 2022-04-29

## 2021-07-16 RX ORDER — IBUPROFEN 600 MG/1
600 TABLET ORAL 3 TIMES DAILY
Qty: 21 TABLET | Refills: 0 | Status: SHIPPED | OUTPATIENT
Start: 2021-07-16 | End: 2021-07-23

## 2021-07-16 RX ADMIN — IBUPROFEN 600 MG: 600 TABLET ORAL at 09:32

## 2021-07-16 RX ADMIN — Medication 5 ML: at 05:50

## 2021-07-16 RX ADMIN — METOPROLOL TARTRATE 25 MG: 25 TABLET, FILM COATED ORAL at 09:32

## 2021-07-16 RX ADMIN — ASPIRIN 81 MG: 81 TABLET, CHEWABLE ORAL at 09:32

## 2021-07-16 NOTE — PROGRESS NOTES
am  7/16/2021 6:33 AM    Admit Date: 7/15/2021    Admit Diagnosis: Elevated troponin [R77.8]; Head ache [R51.9];HTN (hypertension) [I10]      Subjective:    Patient : Patient admitted with acute presumed viral myocarditis his left heart cath showed normal coronaries and normal LV function he has an echo pending this morning if his echo shows normal EF which I suspected well and no evidence of pericardial effusion that he can go home on Motrin if there is evidence of a pericardial effusion or pericardial involvement will consider adding colchicine but his symptoms are vastly improved and I anticipate discharge today after his echo    Objective:      Visit Vitals  /63 (BP 1 Location: Left upper arm, BP Patient Position: At rest)   Pulse 67   Temp 98.1 °F (36.7 °C)   Resp 20   Ht 6' (1.829 m)   Wt 229 lb 14.4 oz (104.3 kg)   SpO2 100%   BMI 31.18 kg/m²       ROS:  General ROS: negative for - chills  Hematological and Lymphatic ROS: negative for - blood clots or jaundice  Respiratory ROS: no cough, shortness of breath, or wheezing  Cardiovascular ROS: no chest pain or dyspnea on exertion  Gastrointestinal ROS: no abdominal pain, change in bowel habits, or black or bloody stools  Neurological ROS: no TIA or stroke symptoms    Physical Exam:      Physical Examination: General appearance - Appearance: alert, well appearing, and in no distress.    Neck/lymph - supple, no significant adenopathy  Chest/CV - clear to auscultation, no wheezes, rales or rhonchi, symmetric air entry  Heart - normal rate, regular rhythm, normal S1, S2, no murmurs, rubs, clicks or gallops  Abdomen/GI - soft, nontender, nondistended, no masses or organomegaly   Musculoskeletal - no joint tenderness, deformity or swelling  Extremities - peripheral pulses normal, no pedal edema, no clubbing or cyanosis  Skin - normal coloration and turgor, no rashes, no suspicious skin lesions noted    Current Facility-Administered Medications   Medication Dose Route Frequency    sodium chloride (NS) flush 5-40 mL  5-40 mL IntraVENous Q8H    sodium chloride (NS) flush 5-40 mL  5-40 mL IntraVENous PRN    aspirin chewable tablet 81 mg  81 mg Oral DAILY    nitroglycerin (NITROSTAT) tablet 0.4 mg  0.4 mg SubLINGual Q5MIN PRN    morphine injection 2 mg  2 mg IntraVENous Q4H PRN    metoprolol tartrate (LOPRESSOR) tablet 25 mg  25 mg Oral Q12H    ibuprofen (MOTRIN) tablet 600 mg  600 mg Oral TID       Data Review: data included in this note has been independently reviewed by the author   CMP:   Lab Results   Component Value Date/Time     07/16/2021 05:52 AM    K 4.4 07/16/2021 05:52 AM     07/16/2021 05:52 AM    CO2 32 07/16/2021 05:52 AM    AGAP 2 (L) 07/16/2021 05:52 AM    GLU 96 07/16/2021 05:52 AM    BUN 14 07/16/2021 05:52 AM    CREA 0.99 07/16/2021 05:52 AM    GFRAA >60 07/16/2021 05:52 AM    GFRNA >60 07/16/2021 05:52 AM    CA 8.8 07/16/2021 05:52 AM    ALB 3.8 07/15/2021 09:00 AM    TP 7.1 07/15/2021 09:00 AM    GLOB 3.3 07/15/2021 09:00 AM    AGRAT 1.2 07/15/2021 09:00 AM    ALT 51 07/15/2021 09:00 AM     CBC:   Lab Results   Component Value Date/Time    WBC 6.5 07/16/2021 05:52 AM    HGB 15.6 07/16/2021 05:52 AM    HCT 45.7 07/16/2021 05:52 AM     (L) 07/16/2021 05:52 AM        TELEMETRY: nsr    Assessment/Plan:     Principal Problem:    Chest pain (7/15/2021)  Acute myocarditis    Active Problems:    Elevated troponin (7/15/2021)  As above      Head ache (7/15/2021)           HTN (hypertension) (7/15/2021)         Acute myocarditis patient has improved significantly clinically if his echo today looks reasonable he should be ready for discharge        Deshaun Ledezma MD

## 2021-07-16 NOTE — PROGRESS NOTES
[address] 35 July 16, 2021       RE: Brenda Alexis      To Whom It May Concern,    This is to certify that Brenda Alexis may return to work Monday July 19, 2021 with light duty restrictions due to being hospitalized 7/15/21-7/16/21. Please feel free to contact my office if you have any questions or concerns. Thank you for your assistance in this matter.       Sincerely,  Chet Holman MD  [facility] Cardiology   [phone]

## 2021-07-16 NOTE — DISCHARGE INSTRUCTIONS
Patient Education      Patient Education   Patient Education   Ibuprofen (By mouth)   Ibuprofen (eye-bue-PROE-fen)  Treats pain and fever. This medicine is an NSAID. Brand Name(s): Advil, Advil Children's, Advil Liqui-Gels, Advil Migraine, All-Purpose First Aid Kit, Children's Ibuprofen, Children's Motrin, Comfort Pac, Concentrated Motrin Infants' Drops, Equate Ibuprofen Issac Strength, Genpril, [facility] Ibuprofen Infants', [facility] Children's Ibuprofen, [facility] Ibuprofen, [facility] Ibuprofen Issac Strength   There may be other brand names for this medicine. When This Medicine Should Not Be Used: This medicine is not right for everyone. Do not use if you had an allergic reaction (including asthma) to ibuprofen, aspirin, or another NSAID, or right before or after heart surgery. How to Use This Medicine:   Capsule, Liquid Filled Capsule, Suspension, Tablet, Chewable Tablet  · Your doctor will tell you how much medicine to use. Do not use more than directed. · Prescription ibuprofen should come with a Medication Guide. Ask your pharmacist for the Medication Guide if you do not have one. · Follow the instructions on the medicine label if you are using this medicine without a prescription. · Take this medicine with food or milk if it upsets your stomach. · Oral liquid: Shake well just before using. Measure with a marked measuring spoon, oral syringe, or medicine cup. · Chewable tablet: Chew completely before you swallow it. Then drink some water to make sure you swallow all of the medicine. · For Children: Ask your pharmacist if you are not sure how much medicine to give a child. The dose is usually based on weight, not age. Never give more medicine than directed. · For Adults: Do not take more than 6 pills in 1 day (24 hours) unless your doctor tells you to. · Missed dose:  If you take this medicine on a regular basis and miss a dose, take it as soon as you can. If it is almost time for your next dose, wait until then to use the medicine and skip the missed dose. Do not use extra medicine to make up for a missed dose. · Store the medicine in a closed container at room temperature, away from heat, moisture, and direct light. Do not freeze the oral liquid. Drugs and Foods to Avoid:   Ask your doctor or pharmacist before using any other medicine, including over-the-counter medicines, vitamins, and herbal products. · Some foods and medicine can affect how ibuprofen works. Tell your doctor if you are also using lithium, methotrexate, a blood thinner (such as warfarin), a steroid medicine (such as hydrocortisone, prednisolone, prednisone), a diuretic (water pill), or an ACE inhibitor blood pressure medicine. · Do not use any other NSAID medicine unless your doctor says it is okay. Some other NSAIDs are aspirin, diclofenac, naproxen, or celecoxib. · Do not drink alcohol while you are using this medicine. Warnings While Using This Medicine:   · Tell your doctor if you are pregnant or breastfeeding. Do not use this medicine during the later part of pregnancy. · Tell your doctor if you have kidney disease, liver disease, asthma, lupus or a similar connective tissue disease, or a history of ulcers or other digestion problems. Tell your doctor if you smoke or have heart or blood circulation problems, including high blood pressure, heart failure (CHF), or bleeding problems. · This medicine may cause the following problems:  ¨ Bleeding and ulcers in the stomach or intestines  ¨ Higher risk of heart attack or stroke  ¨ Liver damage  ¨ Kidney damage  ¨ Vision problems  · Call your doctor if symptoms get worse, pain lasts more than 10 days, or fever lasts more than 3 days. · This medicine might contain sugar or phenylalanine (aspartame). · Tell any doctor or dentist who treats you that you are using this medicine. · Keep all medicine out of the reach of children.  Never share your medicine with anyone. Possible Side Effects While Using This Medicine:   Call your doctor right away if you notice any of these side effects:  · Allergic reaction: Itching or hives, swelling in your face or hands, swelling or tingling in your mouth or throat, chest tightness, trouble breathing  · Blistering, peeling, or red skin rash  · Change in how much or how often you urinate  · Chest pain that may spread to your arms, jaw, back, or neck, trouble breathing, nausea, unusual sweating, faintness  · Chest pain, trouble breathing, weakness on one side of your body, severe headache, trouble seeing or talking, pain in your lower leg  · Dark urine or pale stools, nausea, vomiting, loss of appetite, stomach pain, yellow skin or eyes  · Fever, neck pain, stiff neck  · Severe stomach pain, vomiting blood, bloody or black, tarry stools  · Swelling in your hands, ankles, or feet, rapid weight gain  · Trouble seeing, blind spots, change in how you see colors  · Unusual bleeding, bruising, or weakness  If you notice these less serious side effects, talk with your doctor:   · Constipation, diarrhea, gas, mild upset stomach  · Dizziness, headache, ringing in the ears  If you notice other side effects that you think are caused by this medicine, tell your doctor. Call your doctor for medical advice about side effects. You may report side effects to FDA at 7-807-FDA-1732  © 2017 [facility] Information is for End User's use only and may not be sold, redistributed or otherwise used for commercial purposes. The above information is an  only. It is not intended as medical advice for individual conditions or treatments. Talk to your doctor, nurse or pharmacist before following any medical regimen to see if it is safe and effective for you. Metoprolol/Hydrochlorothiazide (Dutoprol, Lopressor HCT) - (By mouth)   Why this medicine is used:   Treats high blood pressure.   Contact a nurse or doctor right away if you have:  · Blistering, peeling, red skin rash  · Uneven heartbeat  · Rapid weight gain; swelling in your hands, ankles, or feet  · Lightheadedness, dizziness, fainting  · Dry mouth, increased thirst, muscle cramps, nausea or vomiting     Common side effects:  · Depression  · Mild dizziness, headache, tiredness  · Mild diarrhea, constipation, nausea  © 2017 [facility] Information is for End User's use only and may not be sold, redistributed or otherwise used for commercial purposes. Learning About Myocarditis  What is myocarditis? Myocarditis is inflammation of the heart muscle. It may occur after an infection, such as diphtheria, rheumatic fever, or tuberculosis. It may also happen with other damage to the heart from toxins, certain drugs, or an autoimmune disease. The inflammation is part of an immune system response. The body's natural defense system attacks the heart. This can cause serious heart problems, such as dilated cardiomyopathy and heart failure. With these problems, the heart can't pump blood as well as it should. Myocarditis should be treated by a doctor as soon as possible. What are the symptoms? You may be short of breath. You may also have chest pain, feel tired, or have palpitations. (This is the uncomfortable feeling that your heart is beating fast or not in the usual way). Some of these symptoms are similar to symptoms of other heart problems, such as heart failure. In some cases, there may not be any symptoms. Your doctor may find signs of myocarditis while doing other tests on your heart. How is it diagnosed? Your doctor will give you some tests if you have symptoms of myocarditis. You may get an electrocardiogram (EKG or ECG). You may also get other imaging tests like an echocardiogram or MRI. You may get lab tests. Blood tests might be done to check for heart muscle injury.   Your symptoms and test results may be similar to those of people who are having a heart attack. So your doctor might recommend a coronary angiogram to check for coronary artery disease, which can cause a heart attack. You may also need a biopsy in some cases. A biopsy (sample of heart tissue) can confirm if you have myocarditis. And it may help the doctor find the cause. How is it treated? Treatment for myocarditis includes finding and treating the cause. If you are having other serious heart problems, your doctor will treat those at the same time. You may need to take medicine for your heart. If a bacterial infection is the cause, you may need to take antibiotics. Lifestyle changes, such as getting more rest, may be part of the treatment. Many people recover completely from myocarditis. But some people may have long-term problems from it. Follow-up care is a key part of your treatment and safety. Be sure to make and go to all appointments, and call your doctor if you are having problems. It's also a good idea to know your test results and keep a list of the medicines you take. Where can you learn more? Go to http://www.gray.com/  Enter M120 in the search box to learn more about \"Learning About Myocarditis. \"  Current as of: August 31, 2020               Content Version: 12.8  © 0800-0476 Health[employer], [employer]orporated. Care instructions adapted under license by [employer] (which disclaims liability or warranty for this information). If you have questions about a medical condition or this instruction, always ask your healthcare professional. [place] [address] any warranty or liability for your use of this information.

## 2021-07-16 NOTE — ROUTINE PROCESS
Bedside and Verbal shift change report given to Raad Doty (oncoming nurse) by self (offgoing nurse). Report included the following information SBAR, Kardex, ED Summary, Procedure Summary, MAR and Recent Results.

## 2021-07-16 NOTE — DISCHARGE SUMMARY
[facility] Cardiology Discharge Summary     Patient ID:  Danial Koyanagi  [id number]  39 y.o.  1995    Admit date: 7/15/2021    Discharge date:  7/16/2021    Admitting Physician: Rupal Herbert MD     Discharge Physician: Dr. Orestes Avelar    Admission Diagnoses: Elevated troponin [R77.8]  Head ache [R51.9]  HTN (hypertension) [I10]    Discharge Diagnoses:   Patient Active Problem List    Diagnosis Date Noted    Elevated troponin 07/15/2021    Pericarditis 07/15/2021    HTN (hypertension) 07/15/2021    Chest pain 07/15/2021     Cardiology Procedures this admission:  Diagnostic left heart catheterization  EchoCardiogram  Consults: None    Hospital Course: Patient is a 23year old with no prior PMH who presented to [facility] ED with complaints of severe headache and chest pain. Upon evaluation in ED, hs-trop was elevated at 2650-0228. Patient admitted for chest pain concerning for acute myocardial infarction vs myocarditis. Patient was started on heparin gtt with plans to evaluate with cardiac catheterization. Patient underwent cardiac catheterization by Dr. Orestes Avelar. Patient was found to have normal coronary arteriography with normal LV systolic function. 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 Patient tolerated the procedure well and was taken to the telemetry floor for recovery. ECHO was performed and showed:   Left Ventricle Normal cavity size, wall thickness, systolic function (ejection fraction normal) and diastolic function. The muscle mass is normal. The cavity shape is normal. Wall motion: normal. The estimated EF is 55 - 60%. End-systolic volume is normal. Normal left ventricular diastolic pressure. End-diastolic volume is normal.   Left Atrium Normal cavity size. Right Ventricle Normal cavity size, wall thickness and global systolic function. Right Atrium Normal cavity size. Aortic Valve Normal valve structure, trileaflet valve structure, no stenosis and no regurgitation.    Mitral Valve Normal valve structure and no stenosis. Trace regurgitation. Tricuspid Valve Normal valve structure and no stenosis. Trace regurgitation. Pulmonic Valve Normal valve structure and no stenosis. Trace regurgitation. Aorta Normal aortic root, ascending aortic, and aortic arch. Pericardium Normal pericardium and no evidence of pericardial effusion. The morning of discharge, patient was up feeling well without any complaints of chest pain or shortness of breath. Patient's right radial cath site was clean, dry and intact without hematoma or bruit. Patient's labs were stable. Patient was seen and examined by Dr. Drea Donovan and determined stable and ready for discharge. Patient was instructed on the importance of medication compliance and office follow up. The patient will follow up with [facility]iology -- Dr. Torito Santoyo on August 19th at 1:30 pm in [facility]. DISPOSITION: The patient is being discharged home in stable condition on a low saturated fat, low cholesterol and low salt diet. The patient is instructed to advance activities as tolerated to the limit of fatigue or shortness of breath. The patient is instructed to avoid all heavy lifting, straining, stooping or squatting for 3-5 days. The patient is instructed to watch the cath site for bleeding/oozing; if seen, the patient is instructed to apply firm pressure with a clean cloth and call [facility]iology at [phone]. The patient is instructed to watch for signs of infection which include: increasing area of redness, fever/hot to touch or purulent drainage at the catheterization site. The patient is instructed not to soak in a bathtub for 7-10 days, but is cleared to shower. The patient is instructed to call the office or return to the ER for immediate evaluation for any shortness of breath or chest pain not relieved by NTG. Discharge Exam: Patient has been seen by Dr. Drea Donovan: see his progress note for exam details.     Visit Vitals  BP 123/80 (BP 1 Location: Right upper arm, BP Patient Position: Sitting)   Pulse 86   Temp 97.9 °F (36.6 °C)   Resp 18   Ht 6' (1.829 m)   Wt 104.3 kg (229 lb 14.4 oz)   SpO2 98%   BMI 31.18 kg/m²       Recent Results (from the past 24 hour(s))   ECHO ADULT COMPLETE    Collection Time: 07/15/21  1:57 PM   Result Value Ref Range    IVSd 1.09 (A) 0.60 - 1.00 cm    LVIDd 4.97 4.20 - 5.90 cm    LVIDs 4.02 cm    LVOT d 2.18 cm    LVPWd 1.13 (A) 0.60 - 1.00 cm    LVOT Peak Gradient 3.95 mmHg    LVOT Peak Velocity 99.32 cm/s    RVIDd 2.12 cm    Left Atrium Major Axis 2.58 cm    Aortic Valve Area by Continuity of Peak Velocity 2.74 cm2    AoV PG 7.30 mmHg    Aortic Valve Systolic Peak Velocity 762.08 cm/s    MV A Merrill 54.57 cm/s    Mitral Valve E Wave Deceleration Time 152.84 ms    MV E Merrill 69.79 cm/s    E/E' ratio (averaged) 5.42     E/E' lateral 5.14     E/E' septal 5.71     LV E' Lateral Velocity 13.57 cm/s    LV E' Septal Velocity 12.23 cm/s    Mitral Valve Pressure Half-time 44.32 ms    MVA (PHT) 4.96 cm2    Tapse 1.82 1.50 - 2.00 cm    AO ASC D 2.95 cm    Ao Root D 2.82 cm    MV E/A 1.28     LV Mass .7 88.0 - 224.0 g    LV Mass AL Index 91.9 49.0 - 115.0 g/m2    Left Atrium Minor Axis 1.14 cm    CAILIN/BSA Pk Merrill 1.2 cm2/m2   DRUG SCREEN, URINE    Collection Time: 07/16/21  5:50 AM   Result Value Ref Range    PCP(PHENCYCLIDINE) Negative      BENZODIAZEPINES Positive      COCAINE Negative      AMPHETAMINES Negative      METHADONE Negative      THC (TH-CANNABINOL) Negative      OPIATES Positive      BARBITURATES Negative     CBC WITH AUTOMATED DIFF    Collection Time: 07/16/21  5:52 AM   Result Value Ref Range    WBC 6.5 4.3 - 11.1 K/uL    RBC 5.04 4.23 - 5.6 M/uL    HGB 15.6 13.6 - 17.2 g/dL    HCT 45.7 41.1 - 50.3 %    MCV 90.7 79.6 - 97.8 FL    MCH 31.0 26.1 - 32.9 PG    MCHC 34.1 31.4 - 35.0 g/dL    RDW 12.2 11.9 - 14.6 %    PLATELET 799 (L) 777 - 450 K/uL    MPV 10.3 9.4 - 12.3 FL    ABSOLUTE NRBC 0.00 0.0 - 0.2 K/uL    DF AUTOMATED      NEUTROPHILS 53 43 - 78 %    LYMPHOCYTES 30 13 - 44 %    MONOCYTES 15 (H) 4.0 - 12.0 %    EOSINOPHILS 1 0.5 - 7.8 %    BASOPHILS 1 0.0 - 2.0 %    IMMATURE GRANULOCYTES 0 0.0 - 5.0 %    ABS. NEUTROPHILS 3.4 1.7 - 8.2 K/UL    ABS. LYMPHOCYTES 2.0 0.5 - 4.6 K/UL    ABS. MONOCYTES 1.0 0.1 - 1.3 K/UL    ABS. EOSINOPHILS 0.1 0.0 - 0.8 K/UL    ABS. BASOPHILS 0.0 0.0 - 0.2 K/UL    ABS. IMM. GRANS. 0.0 0.0 - 0.5 K/UL   METABOLIC PANEL, BASIC    Collection Time: 07/16/21  5:52 AM   Result Value Ref Range    Sodium 139 138 - 145 mmol/L    Potassium 4.4 3.5 - 5.1 mmol/L    Chloride 105 98 - 107 mmol/L    CO2 32 21 - 32 mmol/L    Anion gap 2 (L) 7 - 16 mmol/L    Glucose 96 65 - 100 mg/dL    BUN 14 6 - 23 MG/DL    Creatinine 0.99 0.8 - 1.5 MG/DL    GFR est AA >60 >60 ml/min/1.73m2    GFR est non-AA >60 >60 ml/min/1.73m2    Calcium 8.8 8.3 - 10.4 MG/DL   LIPID PANEL    Collection Time: 07/16/21  5:52 AM   Result Value Ref Range    Cholesterol, total 199 <200 MG/DL    Triglyceride 238 (H) 35 - 150 MG/DL    HDL Cholesterol 41 40 - 60 MG/DL    LDL, calculated 110.4 (H) <100 MG/DL    VLDL, calculated 47.6 (H) 6.0 - 23.0 MG/DL    CHOL/HDL Ratio 4.9       Patient Instructions:   Current Discharge Medication List      START taking these medications    Details   metoprolol tartrate (LOPRESSOR) 25 mg tablet Take 1 Tablet by mouth every twelve (12) hours. Qty: 60 Tablet, Refills: 3  Start date: 7/16/2021      ibuprofen (MOTRIN) 600 mg tablet Take 1 Tablet by mouth three (3) times daily for 7 days.   Qty: 21 Tablet, Refills: 0  Start date: 7/16/2021, End date: 7/23/2021               Signed:  CHARLY Cabrera  7/16/2021  8:45 AM

## 2021-07-16 NOTE — PROGRESS NOTES
Care Management Interventions  Transition of Care Consult (CM Consult): Discharge Planning  Discharge Durable Medical Equipment: No  Physical Therapy Consult: No  Occupational Therapy Consult: No  Discharge Location  Discharge Placement: Home  Chart screened by  for discharge planning. No needs identified at this time. Please consult  if any new issues arise. 1300 Discharge order. No CM needs noted.

## 2021-09-07 PROBLEM — Z86.79 HISTORY OF MYOCARDITIS: Status: ACTIVE | Noted: 2021-09-07

## 2021-09-07 PROBLEM — R07.89 ATYPICAL CHEST PAIN: Status: ACTIVE | Noted: 2021-07-15

## 2021-09-07 PROBLEM — E66.9 OBESITY (BMI 30-39.9): Status: ACTIVE | Noted: 2021-09-07

## 2022-03-18 PROBLEM — I10 HTN (HYPERTENSION): Status: ACTIVE | Noted: 2021-07-15

## 2022-03-18 PROBLEM — Z86.79 HISTORY OF MYOCARDITIS: Status: ACTIVE | Noted: 2021-09-07

## 2022-03-19 PROBLEM — E66.9 OBESITY (BMI 30-39.9): Status: ACTIVE | Noted: 2021-09-07

## 2022-03-19 PROBLEM — R51.9 HEAD ACHE: Status: ACTIVE | Noted: 2021-07-15

## 2022-03-19 PROBLEM — R07.89 ATYPICAL CHEST PAIN: Status: ACTIVE | Noted: 2021-07-15

## 2022-03-20 PROBLEM — R79.89 ELEVATED TROPONIN LEVEL NOT DUE MYOCARDIAL INFARCTION: Status: ACTIVE | Noted: 2021-07-15

## 2022-03-20 PROBLEM — R77.8 ELEVATED TROPONIN LEVEL NOT DUE MYOCARDIAL INFARCTION: Status: ACTIVE | Noted: 2021-07-15

## 2022-04-29 PROBLEM — E78.2 MIXED HYPERLIPIDEMIA: Status: ACTIVE | Noted: 2022-04-29

## 2022-04-29 PROBLEM — R07.89 ATYPICAL CHEST PAIN: Status: RESOLVED | Noted: 2021-07-15 | Resolved: 2022-04-29

## 2022-04-29 PROBLEM — R51.9 HEAD ACHE: Status: RESOLVED | Noted: 2021-07-15 | Resolved: 2022-04-29

## 2022-04-29 PROBLEM — R77.8 ELEVATED TROPONIN LEVEL NOT DUE MYOCARDIAL INFARCTION: Status: RESOLVED | Noted: 2021-07-15 | Resolved: 2022-04-29

## 2022-04-29 PROBLEM — Z86.16 HISTORY OF COVID-19: Status: ACTIVE | Noted: 2022-04-29

## 2022-04-29 PROBLEM — R79.89 ELEVATED TROPONIN LEVEL NOT DUE MYOCARDIAL INFARCTION: Status: RESOLVED | Noted: 2021-07-15 | Resolved: 2022-04-29

## 2022-05-11 ENCOUNTER — HOSPITAL ENCOUNTER (OUTPATIENT)
Dept: GENERAL RADIOLOGY | Age: 27
Discharge: HOME | End: 2022-05-11

## 2022-05-11 DIAGNOSIS — M25.562 ACUTE PAIN OF BOTH KNEES: ICD-10-CM

## 2022-05-11 DIAGNOSIS — M25.561 ACUTE PAIN OF BOTH KNEES: ICD-10-CM

## 2022-05-13 NOTE — PROGRESS NOTES
Bilateral knee xray negative for fracture or malallignment, joint spaces well preserved; no joint effusion;

## 2022-05-18 NOTE — PROGRESS NOTES
Talked to pt and informed him, per Dr. Shaunna Mason, bilateral knee xray negative for fracture or malallignment, joint spaces well preserved; no joint effusion.

## 2022-05-21 ENCOUNTER — TELEPHONE (OUTPATIENT)
Dept: INTERNAL MEDICINE CLINIC | Facility: CLINIC | Age: 27
End: 2022-05-21

## 2022-05-21 DIAGNOSIS — R79.9 ABNORMAL BLOOD CHEMISTRY: ICD-10-CM

## 2022-05-21 DIAGNOSIS — E78.2 MIXED HYPERLIPIDEMIA: Primary | ICD-10-CM

## 2022-05-24 DIAGNOSIS — D69.6 THROMBOCYTOPENIA (HCC): Primary | ICD-10-CM

## 2022-05-24 LAB
BASOPHILS # BLD: 0 K/UL (ref 0–0.2)
BASOPHILS NFR BLD: 1 % (ref 0–2)
DIFFERENTIAL METHOD BLD: ABNORMAL
EOSINOPHIL # BLD: 0.1 K/UL (ref 0–0.8)
EOSINOPHIL NFR BLD: 2 % (ref 0.5–7.8)
ERYTHROCYTE [DISTWIDTH] IN BLOOD BY AUTOMATED COUNT: 12.6 % (ref 11.9–14.6)
HCT VFR BLD AUTO: 46.8 % (ref 41.1–50.3)
HGB BLD-MCNC: 15.8 G/DL (ref 13.6–17.2)
IMM GRANULOCYTES # BLD AUTO: 0 K/UL (ref 0–0.5)
IMM GRANULOCYTES NFR BLD AUTO: 0 % (ref 0–5)
LYMPHOCYTES # BLD: 2 K/UL (ref 0.5–4.6)
LYMPHOCYTES NFR BLD: 38 % (ref 13–44)
MCH RBC QN AUTO: 30.3 PG (ref 26.1–32.9)
MCHC RBC AUTO-ENTMCNC: 33.8 G/DL (ref 31.4–35)
MCV RBC AUTO: 89.8 FL (ref 79.6–97.8)
MONOCYTES # BLD: 0.5 K/UL (ref 0.1–1.3)
MONOCYTES NFR BLD: 10 % (ref 4–12)
NEUTS SEG # BLD: 2.6 K/UL (ref 1.7–8.2)
NEUTS SEG NFR BLD: 49 % (ref 43–78)
NRBC # BLD: 0 K/UL (ref 0–0.2)
PLATELET # BLD AUTO: 79 K/UL (ref 150–450)
PMV BLD AUTO: 10.5 FL (ref 9.4–12.3)
RBC # BLD AUTO: 5.21 M/UL (ref 4.23–5.6)
WBC # BLD AUTO: 5.3 K/UL (ref 4.3–11.1)

## 2022-05-27 ENCOUNTER — TELEMEDICINE (OUTPATIENT)
Dept: INTERNAL MEDICINE CLINIC | Facility: CLINIC | Age: 27
End: 2022-05-27
Payer: COMMERCIAL

## 2022-05-27 ENCOUNTER — TELEPHONE (OUTPATIENT)
Dept: INTERNAL MEDICINE CLINIC | Facility: CLINIC | Age: 27
End: 2022-05-27

## 2022-05-27 DIAGNOSIS — E78.2 MIXED HYPERLIPIDEMIA: ICD-10-CM

## 2022-05-27 DIAGNOSIS — D69.6 THROMBOCYTOPENIA (HCC): ICD-10-CM

## 2022-05-27 DIAGNOSIS — I10 PRIMARY HYPERTENSION: Primary | ICD-10-CM

## 2022-05-27 DIAGNOSIS — Z86.79 HISTORY OF MYOCARDITIS: ICD-10-CM

## 2022-05-27 PROCEDURE — 99213 OFFICE O/P EST LOW 20 MIN: CPT | Performed by: INTERNAL MEDICINE

## 2022-05-27 RX ORDER — ECHINACEA PURPUREA EXTRACT 125 MG
1 TABLET ORAL PRN
COMMUNITY
Start: 2021-10-13 | End: 2022-10-13

## 2022-05-27 ASSESSMENT — ENCOUNTER SYMPTOMS: RESPIRATORY NEGATIVE: 1

## 2022-05-27 NOTE — PROGRESS NOTES
[facility]      2022    Patient Name: Wilber Rivera  :  1995    Subjective:    Chief Complaint:  Chief Complaint   Patient presents with    Follow-up         HPI I was at home while conducting this encounter. Consent:  He and/or his healthcare decision maker is aware that this patient-initiated Telehealth encounter is a billable service, with coverage as determined by his insurance carrier. He is aware that he may receive a bill and has provided verbal consent to proceed: Yes    This virtual visit was conducted via [employer]. Pursuant to the emergency declaration under the [facility], [facility] waiver authority and the [employer] Act, this Virtual  Visit was conducted to reduce the patient's risk of exposure to COVID-19 and provide continuity of care for an established patient. Services were provided through a video synchronous discussion virtually to substitute for in-person clinic visit. Due to this being a TeleHealth evaluation, many elements of the physical examination are unable to be assessed. Total Time: minutes: 11-20 minutes. Patient evaluated for f/u visit; patient had fasting labs done recently and results were reviewed in detail today; he was seen as a new patient earlier this month; unfortunately, only CBC was resulted from recent labs, will be contacted with remaining results when available;    He has history of myocarditis, Covid 19 infection; he saw Dr. Vicki Perez in September; cardiac MRI was ordered at that time, but he did not have it done due to job change; he plans to call and schedule an appointment; he denies SOB, ELLIS;   HTN:  Patient compliant with taking blood pressure medications: no longer taking Metoprolol  Discussed importance of following low sodium DASH diet, increasing physical activity, taking medications as ordered, decreasing alcohol intake, keeping f/u visits to recheck blood pressure, monitoring blood pressure at home and keeping a log, with goal blood pressure <140/90. Home bp readings are: good        Past Medical History:   Diagnosis Date    History of COVID-19     History of myocarditis     HTN (hypertension)        Past Surgical History:   Procedure Laterality Date    TONSILLECTOMY      WISDOM TOOTH EXTRACTION         Family History   Problem Relation Age of Onset    Stroke Maternal Grandfather     Hypertension Father     Clotting Disorder Mother        Social History     Tobacco Use    Smoking status: Never Smoker    Smokeless tobacco: Never Used   Substance Use Topics    Alcohol use: Yes    Drug use: Not Currently                 Current Outpatient Medications:     diclofenac sodium (VOLTAREN) 1 % GEL, Apply 2 g topically 4 times daily, Disp: , Rfl:     sodium chloride (OCEAN) 0.65 % nasal spray, 1 spray by Nasal route as needed, Disp: , Rfl:     Not on File    Review of Systems   Constitutional: Negative. HENT: Negative. Respiratory: Negative. Cardiovascular: Negative. Objective: There were no vitals taken for this visit. Examination:  Physical Exam  Neurological:      Mental Status: He is alert. Psychiatric:         Mood and Affect: Mood normal.         Thought Content: Thought content normal.         Judgment: Judgment normal.           Assessment/Plan:    Festus Crystal was seen today for follow-up. Diagnoses and all orders for this visit:    Primary hypertension  He is no longer on Metoprolol and feeling well; recommended low sodium diet, monitoring bp at home with goal <140/90.    -     Urinalysis, Micro; Future    Mixed hyperlipidemia  -     Comprehensive Metabolic Panel; Future  -     Lipid Panel; Future    History of myocarditis  He denies CP, ELLIS, SOB, is feeling well; plans to schedule f/u w/ Cardiology regarding rescheduling cardiac MRI vs Echo;      Thrombocytopenia ([address]  He was referred to Hem/Onc and is in the process of scheduling appointment. -     CBC with Auto Differential; Future          Follow-up and Dispositions    · Return in about 6 months (around 11/27/2022), or if symptoms worsen or fail to improve, for f/u w/ labs. Medication Reconciliation:  Current Medications Verified: Current medications/ immunizations were reviewed, including purpose, with patient. Family History, Social History, Current and Past Medical History was reviewed with patient and updated at today's office visit. Medication Reconciliation list was given to patient/ family. Patient was advised to discard old medication lists and provide all providers with current list at each visit and carry list with them in case of emergency.     Completed By:   Luis Manuel Navarro MD    [place] [facility] [place]  [address] [facility] [address], [address], [address]  [phone]  [phone] fax  3:27 PM

## 2022-05-27 NOTE — TELEPHONE ENCOUNTER
Only CBC was resulted from patient's recent labs; please check with lab regarding remaining results and let me know; not sure if something got lost during Epic changeover, as lab results are not in old version of epic either;

## 2022-06-02 LAB
ALBUMIN SERPL-MCNC: 4.1 G/DL (ref 3.5–5)
ALBUMIN/GLOB SERPL: 1.1 {RATIO} (ref 1.2–3.5)
ALP SERPL-CCNC: 54 U/L (ref 50–136)
ALT SERPL-CCNC: 42 U/L (ref 12–65)
ANION GAP SERPL CALC-SCNC: 3 MMOL/L (ref 7–16)
APPEARANCE UR: CLEAR
AST SERPL-CCNC: 20 U/L (ref 15–37)
BACTERIA URNS QL MICRO: ABNORMAL /HPF
BILIRUB SERPL-MCNC: 1 MG/DL (ref 0.2–1.1)
BILIRUB UR QL: NEGATIVE
BUN SERPL-MCNC: 16 MG/DL (ref 6–23)
CALCIUM SERPL-MCNC: 9.8 MG/DL (ref 8.3–10.4)
CHLORIDE SERPL-SCNC: 105 MMOL/L (ref 98–107)
CHOLEST SERPL-MCNC: 222 MG/DL
CO2 SERPL-SCNC: 31 MMOL/L (ref 21–32)
COLOR UR: YELLOW
CREAT SERPL-MCNC: 1.1 MG/DL (ref 0.8–1.5)
EPITH CASTS URNS QL MICRO: ABNORMAL /LPF
EST. AVERAGE GLUCOSE BLD GHB EST-MCNC: 105 MG/DL
GLOBULIN SER CALC-MCNC: 3.6 G/DL (ref 2.3–3.5)
GLUCOSE SERPL-MCNC: 91 MG/DL (ref 65–100)
GLUCOSE UR STRIP.AUTO-MCNC: NEGATIVE MG/DL
HBA1C MFR BLD: 5.3 % (ref 4.2–6.3)
HDLC SERPL-MCNC: 49 MG/DL (ref 40–60)
HDLC SERPL: 4.5 {RATIO}
HGB UR QL STRIP: NEGATIVE
KETONES UR QL STRIP.AUTO: NEGATIVE MG/DL
LDLC SERPL CALC-MCNC: 142.6 MG/DL
LEUKOCYTE ESTERASE UR QL STRIP.AUTO: NEGATIVE
NITRITE UR QL STRIP.AUTO: NEGATIVE
PH UR STRIP: 5.5 [PH] (ref 5–9)
POTASSIUM SERPL-SCNC: 4.8 MMOL/L (ref 3.5–5.1)
PROT SERPL-MCNC: 7.7 G/DL (ref 6.3–8.2)
PROT UR STRIP-MCNC: NEGATIVE MG/DL
RBC #/AREA URNS HPF: ABNORMAL /HPF
SODIUM SERPL-SCNC: 139 MMOL/L (ref 138–145)
SP GR UR REFRACTOMETRY: >1.03 (ref 1–1.02)
T4 FREE SERPL-MCNC: 0.8 NG/DL (ref 0.9–1.8)
TRIGL SERPL-MCNC: 152 MG/DL (ref 35–150)
UROBILINOGEN UR QL STRIP.AUTO: 0.2 EU/DL (ref 0.2–1)
VLDLC SERPL CALC-MCNC: 30.4 MG/DL (ref 6–23)
WBC URNS QL MICRO: ABNORMAL /HPF

## 2022-06-06 ENCOUNTER — TELEPHONE (OUTPATIENT)
Dept: INTERNAL MEDICINE CLINIC | Facility: CLINIC | Age: 27
End: 2022-06-06

## 2022-06-06 NOTE — TELEPHONE ENCOUNTER
Recent labs revealed normal renal function and liver function; cholesterol is high, follow low fat, low cholesterol diet, avoid fried foods, red meat in diet, more fish and chicken that's baked, broiled or grilled, skim milk, etc; A1c 5.3, WNL, UA WNL;

## 2022-06-07 ENCOUNTER — TELEPHONE (OUTPATIENT)
Dept: INTERNAL MEDICINE CLINIC | Facility: CLINIC | Age: 27
End: 2022-06-07

## 2022-06-07 NOTE — TELEPHONE ENCOUNTER
Lab results from 5/24/22: fasting glucose 91, WNL; normal renal and hepatic function; lipids a bit high, follow low fat, low cholesterol diet, get regular exercise; will monitor; CBC revealed normal hemoglobin, but platelets are low and he was referred to Hematology; A1c 5.3, WNL, is not diabetic or prediabetic; UA WNL; labs otherwise stable, continue meds, diet;

## 2022-06-10 DIAGNOSIS — Z86.16 HISTORY OF COVID-19: ICD-10-CM

## 2022-06-10 DIAGNOSIS — D69.6 THROMBOCYTOPENIA (HCC): Primary | ICD-10-CM

## 2022-06-14 NOTE — PROGRESS NOTES
New Patient Abstract    Reason for Referral: Thrombocytopenia    Referring Provider:  Erendira Mcgovern MD    Primary Care Provider: Erendira Mcgovern MD    Family History of Cancer/Hematologic Disorders: Mother with clotting disorder; MGF with stroke    Presenting Symptoms: myalgias    Narrative with recent with Results/Procedures/Biopsies and Dates completed:   Mr. Melva Petersen is a 73-year-old  male who reports to have never used tobacco products. He reports alcohol use and states not currently to drug substance use. His medical history reports as COVID-19+ (8/2021), myocarditis and HTN. His surgical history reports as tonsillectomy, heart catherization and wisdom teeth extraction. In April 2022, Mr. Melva Petersen presented to Dr Alpesh Holbrook to establish PCP care. 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 He is followed by cardiologist, Dr. Dolly Martínez. On 5/24/22 his CBC reported a decreased platelet count of 97Y otherwise WNL. His previous STF CBC on 7/16/21 reported a platelet count of 148H with a count on 7/15/21 of 160k. His 5/2022 chemistry panel reported a normal BUN and creatinine. A referral was placed to hematology to further evaluate his thrombocytopenia.      Labs   7/15/2021 09:00 7/16/2021 05:52 5/24/2022 15:19   WBC 5.7 6.5 5.3   RBC 4.66 5.04 5.21   Hemoglobin Quant 15.0 15.6 15.8   Hematocrit 41.1 45.7 46.8   MCV 88.2 90.7 89.8   MCH 32.1 31.0 30.3   MCHC 36.4 (H) 34.1 33.8   MPV 9.5 10.3 10.5   RDW 12.1 12.2 12.6   Platelet Count 196 046 (L) 79 (L)   Neutrophils % 56 53    Lymphocyte % 23 30    Absolute Mono #   0.5   Monocytes % 20 (H) 15 (H)    Eosinophils % 1 1 2   Basophils % 0 1    Neutrophils Absolute 3.0 3.4    Lymphocytes Absolute 1.2 2.0    Monocytes Absolute 1.0 1.0    Eosinophils Absolute 0.0 0.1    Basophils Absolute 0.0 0.0 0.0   Differential Type AUTOMATED AUTOMATED AUTOMATED   Seg Neutrophils   49   GRANULOCYTE ABSOLUTE COUNT 0.0 0.0    Segs Absolute   2.6 Lymphocytes   38   Absolute Lymph #   2.0   Monocytes   10   Absolute Eos #   0.1   Basophils   1   Immature Granulocytes 0 0 0   Nucleated Red Blood Cells   0.00   Sed Rate 4        7/16/2021 05:50 7/16/2021 05:52 5/24/2022 15:19   Potassium  4.4 4.8   Chloride  105 105   CO2  32 31   BUN  14 16   Creatinine  0.99 1.10   Anion Gap  2 (L) 3 (L)   GFR Non-African American   >60   EGFR IF NonAfrican American  >60    GFR   >60 >60   GLUCOSE  96 91   CALCIUM  8.8 9.8   ALBUMIN/GLOBULIN RATIO   1.1 (L)   Total Protein   7.7   Albumin   4.1   Globulin   3.6 (H)   Alk Phosphatase   54   ALT   42   AST   20   Bilirubin   1.0   T4 Free   0.8 (L)   Amphetamine Screen, Urine Negative     Barbiturate Screen, Urine Negative     Benzodiazepine Screen, Urine Positive     Cocaine Screen Urine Negative     METHADONE SCREEN, URINE, 99576 Negative     Opiate Screen, Urine Positive     PCP Screen, Urine Negative        7/15/2021 09:00   Prothrombin Time 14.9 (H)   INR 1.1   aPTT 28.6   D-Dimer, Quant 0.38       Notes from Referring Provider: n/a    Other Pertinent Information: n/a    Presented at Tumor Board: n/a

## 2022-06-16 ENCOUNTER — OFFICE VISIT (OUTPATIENT)
Dept: ONCOLOGY | Age: 27
End: 2022-06-16
Payer: COMMERCIAL

## 2022-06-16 ENCOUNTER — HOSPITAL ENCOUNTER (OUTPATIENT)
Dept: LAB | Age: 27
Discharge: HOME | End: 2022-06-19
Payer: COMMERCIAL

## 2022-06-16 VITALS
OXYGEN SATURATION: 98 % | BODY MASS INDEX: 30.48 KG/M2 | DIASTOLIC BLOOD PRESSURE: 83 MMHG | HEART RATE: 81 BPM | WEIGHT: 230 LBS | HEIGHT: 73 IN | SYSTOLIC BLOOD PRESSURE: 126 MMHG | RESPIRATION RATE: 19 BRPM | TEMPERATURE: 98.2 F

## 2022-06-16 DIAGNOSIS — Z86.16 HISTORY OF COVID-19: ICD-10-CM

## 2022-06-16 DIAGNOSIS — D69.6 THROMBOCYTOPENIA (HCC): Primary | ICD-10-CM

## 2022-06-16 DIAGNOSIS — D69.6 THROMBOCYTOPENIA (HCC): ICD-10-CM

## 2022-06-16 LAB
ALBUMIN SERPL-MCNC: 4.2 G/DL (ref 3.5–5)
ALBUMIN/GLOB SERPL: 1.2 {RATIO} (ref 1.2–3.5)
ALP SERPL-CCNC: 53 U/L (ref 50–136)
ALT SERPL-CCNC: 49 U/L (ref 12–65)
ANION GAP SERPL CALC-SCNC: 7 MMOL/L (ref 7–16)
AST SERPL-CCNC: 28 U/L (ref 15–37)
BASOPHILS # BLD: 0 K/UL (ref 0–0.2)
BASOPHILS NFR BLD: 1 % (ref 0–2)
BILIRUB SERPL-MCNC: 1.3 MG/DL (ref 0.2–1.1)
BUN SERPL-MCNC: 19 MG/DL (ref 6–23)
CALCIUM SERPL-MCNC: 9.4 MG/DL (ref 8.3–10.4)
CHLORIDE SERPL-SCNC: 107 MMOL/L (ref 98–107)
CO2 SERPL-SCNC: 26 MMOL/L (ref 21–32)
CREAT SERPL-MCNC: 1.2 MG/DL (ref 0.8–1.5)
DAT POLY-SP REAG RBC QL: NORMAL
DIFFERENTIAL METHOD BLD: NORMAL
EOSINOPHIL # BLD: 0.1 K/UL (ref 0–0.8)
EOSINOPHIL NFR BLD: 1 % (ref 0.5–7.8)
ERYTHROCYTE [DISTWIDTH] IN BLOOD BY AUTOMATED COUNT: 12.5 % (ref 11.9–14.6)
FERRITIN SERPL-MCNC: 77 NG/ML (ref 8–388)
GLOBULIN SER CALC-MCNC: 3.6 G/DL (ref 2.3–3.5)
GLUCOSE SERPL-MCNC: 114 MG/DL (ref 65–100)
HAV IGM SER QL: NONREACTIVE
HBV CORE IGM SER QL: NONREACTIVE
HBV SURFACE AG SER QL: NONREACTIVE
HCT VFR BLD AUTO: 46.3 %
HCV AB SER QL: NONREACTIVE
HGB BLD-MCNC: 15.9 G/DL (ref 13.6–17.2)
HGB RETIC QN AUTO: 37 PG (ref 29–35)
HIV 1+2 AB+HIV1 P24 AG SERPL QL IA: NONREACTIVE
HIV 1/2 RESULT COMMENT: NORMAL
IMM GRANULOCYTES # BLD AUTO: 0 K/UL (ref 0–0.5)
IMM GRANULOCYTES NFR BLD AUTO: 0 % (ref 0–5)
IMM RETICS NFR: 6.4 % (ref 2.3–13.4)
IRON SATN MFR SERPL: 32 %
IRON SERPL-MCNC: 115 UG/DL (ref 35–150)
LDH SERPL L TO P-CCNC: 184 U/L (ref 100–190)
LYMPHOCYTES # BLD: 1.9 K/UL (ref 0.5–4.6)
LYMPHOCYTES NFR BLD: 39 % (ref 13–44)
MCH RBC QN AUTO: 30.3 PG (ref 26.1–32.9)
MCHC RBC AUTO-ENTMCNC: 34.3 G/DL (ref 31.4–35)
MCV RBC AUTO: 88.2 FL (ref 79.6–97.8)
MONOCYTES # BLD: 0.5 K/UL (ref 0.1–1.3)
MONOCYTES NFR BLD: 10 % (ref 4–12)
NEUTS SEG # BLD: 2.4 K/UL (ref 1.7–8.2)
NEUTS SEG NFR BLD: 49 % (ref 43–78)
NRBC # BLD: 0 K/UL (ref 0–0.2)
PLATELET # BLD AUTO: 157 K/UL (ref 150–450)
PMV BLD AUTO: 11.2 FL (ref 9.4–12.3)
POTASSIUM SERPL-SCNC: 3.9 MMOL/L (ref 3.5–5.1)
PROT SERPL-MCNC: 7.8 G/DL (ref 6.3–8.2)
RBC # BLD AUTO: 5.25 M/UL (ref 4.23–5.6)
RETICS # AUTO: 0.08 M/UL (ref 0.03–0.1)
RETICS/RBC NFR AUTO: 1.6 % (ref 0.3–2)
SODIUM SERPL-SCNC: 140 MMOL/L (ref 136–145)
TIBC SERPL-MCNC: 355 UG/DL (ref 250–450)
URATE SERPL-MCNC: 8.1 MG/DL (ref 2.6–6)
WBC # BLD AUTO: 4.9 K/UL (ref 4.3–11.1)

## 2022-06-16 PROCEDURE — 82785 ASSAY OF IGE: CPT

## 2022-06-16 PROCEDURE — 87389 HIV-1 AG W/HIV-1&-2 AB AG IA: CPT

## 2022-06-16 PROCEDURE — 83540 ASSAY OF IRON: CPT

## 2022-06-16 PROCEDURE — 82784 ASSAY IGA/IGD/IGG/IGM EACH: CPT

## 2022-06-16 PROCEDURE — 82728 ASSAY OF FERRITIN: CPT

## 2022-06-16 PROCEDURE — 86880 COOMBS TEST DIRECT: CPT

## 2022-06-16 PROCEDURE — 80053 COMPREHEN METABOLIC PANEL: CPT

## 2022-06-16 PROCEDURE — 85046 RETICYTE/HGB CONCENTRATE: CPT

## 2022-06-16 PROCEDURE — 84550 ASSAY OF BLOOD/URIC ACID: CPT

## 2022-06-16 PROCEDURE — 80074 ACUTE HEPATITIS PANEL: CPT

## 2022-06-16 PROCEDURE — 83615 LACTATE (LD) (LDH) ENZYME: CPT

## 2022-06-16 PROCEDURE — 99205 OFFICE O/P NEW HI 60 MIN: CPT | Performed by: PEDIATRICS

## 2022-06-16 PROCEDURE — 36415 COLL VENOUS BLD VENIPUNCTURE: CPT

## 2022-06-16 PROCEDURE — 86022 PLATELET ANTIBODIES: CPT

## 2022-06-16 PROCEDURE — 85025 COMPLETE CBC W/AUTO DIFF WBC: CPT

## 2022-06-16 ASSESSMENT — PATIENT HEALTH QUESTIONNAIRE - PHQ9
SUM OF ALL RESPONSES TO PHQ QUESTIONS 1-9: 0
SUM OF ALL RESPONSES TO PHQ QUESTIONS 1-9: 0
SUM OF ALL RESPONSES TO PHQ9 QUESTIONS 1 & 2: 0
1. LITTLE INTEREST OR PLEASURE IN DOING THINGS: 0
2. FEELING DOWN, DEPRESSED OR HOPELESS: 0
SUM OF ALL RESPONSES TO PHQ QUESTIONS 1-9: 0
SUM OF ALL RESPONSES TO PHQ QUESTIONS 1-9: 0

## 2022-06-16 NOTE — PROGRESS NOTES
HISTORY OF PRESENT ILLNESS  Khushbu Way is a 33 y.o. y.o. male with thrombocytopenia  ABSTRACT  Reason for Referral: Thrombocytopenia     Referring Provider:  Jenna Vaughan MD     Primary Care Provider: Jenna Vaughan MD     Family History of Cancer/Hematologic Disorders: Mother with clotting disorder; MGF with stroke     Presenting Symptoms: myalgias     Narrative with recent with Results/Procedures/Biopsies and Dates completed:   Mr. Netta Moser is a 23-year-old  male who reports to have never used tobacco products. He reports alcohol use and states not currently to drug substance use. His medical history reports as COVID-19+ (8/2021), myocarditis and HTN. His surgical history reports as tonsillectomy, heart catherization and wisdom teeth extraction. In April 2022, Mr. Netta Moser presented to Dr Kamron Ramsey to establish PCP care. 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 He is followed by cardiologist, Dr. Renetta Read. On 5/24/22 his CBC reported a decreased platelet count of 37B otherwise WNL. His previous STF CBC on 7/16/21 reported a platelet count of 065O with a count on 7/15/21 of 160k. His 5/2022 chemistry panel reported a normal BUN and creatinine.  A referral was placed to hematology to further evaluate his thrombocytopenia.      Labs    7/15/2021 09:00 7/16/2021 05:52 5/24/2022 15:19   WBC 5.7 6.5 5.3   RBC 4.66 5.04 5.21   Hemoglobin Quant 15.0 15.6 15.8   Hematocrit 41.1 45.7 46.8   MCV 88.2 90.7 89.8   MCH 32.1 31.0 30.3   MCHC 36.4 (H) 34.1 33.8   MPV 9.5 10.3 10.5   RDW 12.1 12.2 12.6   Platelet Count 631 520 (L) 79 (L)   Neutrophils % 56 53     Lymphocyte % 23 30     Absolute Mono #     0.5   Monocytes % 20 (H) 15 (H)     Eosinophils % 1 1 2   Basophils % 0 1     Neutrophils Absolute 3.0 3.4     Lymphocytes Absolute 1.2 2.0     Monocytes Absolute 1.0 1.0     Eosinophils Absolute 0.0 0.1     Basophils Absolute 0.0 0.0 0.0   Differential Type AUTOMATED AUTOMATED AUTOMATED Seg Neutrophils     49   GRANULOCYTE ABSOLUTE COUNT 0.0 0.0     Segs Absolute     2.6   Lymphocytes     38   Absolute Lymph #     2.0   Monocytes     10   Absolute Eos #     0.1   Basophils     1   Immature Granulocytes 0 0 0   Nucleated Red Blood Cells     0.00   Sed Rate 4            7/16/2021 05:50 7/16/2021 05:52 5/24/2022 15:19   Potassium   4.4 4.8   Chloride   105 105   CO2   32 31   BUN   14 16   Creatinine   0.99 1.10   Anion Gap   2 (L) 3 (L)   GFR Non-African American     >60   EGFR IF NonAfrican American   >60     GFR    >60 >60   GLUCOSE   96 91   CALCIUM   8.8 9.8   ALBUMIN/GLOBULIN RATIO     1.1 (L)   Total Protein     7.7   Albumin     4.1   Globulin     3.6 (H)   Alk Phosphatase     54   ALT     42   AST     20   Bilirubin     1.0   T4 Free     0.8 (L)   Amphetamine Screen, Urine Negative       Barbiturate Screen, Urine Negative       Benzodiazepine Screen, Urine Positive       Cocaine Screen Urine Negative       METHADONE SCREEN, URINE, 25387 Negative       Opiate Screen, Urine Positive       PCP Screen, Urine Negative            7/15/2021 09:00   Prothrombin Time 14.9 (H)   INR 1.1   aPTT 28.6   D-Dimer, Quant 0.38            HPI: referred for eval of low plts  Had myocarditis last year plts down to 144  Viral in nature, recovered fully   Off of metoprolol  Pending follow up MRI heart    A few months ago nose bleeds   Occasional every 6 months    Mom had blood clots in legs    Patient Denies:  Nose bleeds  Gum bleeds  Bruising or petechia  Bleeding with surgery  Bleeding with accidents  Transfusions  History or free bleeding or hemophilia  No von willebrand  s    Patient Denies:   Fevers   Night Sweats   Chills   Weight Loss   Bone Pain   Lymphadenopathy  Patient Denies:  Nose bleeds  Gum bleeds  Bruising or petechia  Bleeding with surgery  Bleeding with accidents  Transfusions  History or free bleeding or hemophilia    Current Outpatient Medications   Medication Sig    diclofenac sodium (VOLTAREN) 1 % GEL Apply 2 g topically 4 times daily    sodium chloride (OCEAN) 0.65 % nasal spray 1 spray by Nasal route as needed     No current facility-administered medications for this visit. Past Medical History:   Diagnosis Date    History of COVID-19     History of myocarditis     HTN (hypertension)        Past Surgical History:   Procedure Laterality Date    TONSILLECTOMY      WISDOM TOOTH EXTRACTION         Family History   Problem Relation Age of Onset    Stroke Maternal Grandfather     Hypertension Father     Clotting Disorder Mother        Social History     Tobacco Use    Smoking status: Never Smoker    Smokeless tobacco: Never Used   Substance Use Topics    Alcohol use: Yes    Drug use: Not Currently       Immunization History   Administered Date(s) Administered    Tdap (Boostrix, Adacel) 12/07/2018       No Known Allergies      Review of Systems   Review of Systems   Constitutional: Negative. HENT: Negative. Eyes: Negative. Respiratory: Negative. Cardiovascular: Negative. Gastrointestinal: Negative. Genitourinary: Negative. Musculoskeletal: Negative. Skin: Negative. Neurological: Negative. Endo/Heme/Allergies: Negative. Psychiatric/Behavioral: Negative. Pain reviewed fully and addressed this visit  Med review and reconciliation addressed fully this visit  ADLs and performance level addressed, ECOG 0 unless otherwise addressed    Blood pressure 126/83, pulse 81, temperature 98.2 °F (36.8 °C), temperature source Oral, resp. rate 19, height 6' 1\" (1.854 m), weight 230 lb (104.3 kg), SpO2 98 %. Physical Exam:   Constitutional: Well developed, well nourished male in no acute distress, sitting comfortably   HEENT: Normocephalic and atraumatic. Oropharynx is clear, mucous membranes are moist.  Pupils are equal, round, and reactive to light. Extraocular muscles are intact. Sclerae anicteric. Neck supple without JVD.  No thyromegaly present. Lymph node   No palpable submandibular, cervical, supraclavicular, axillary or inguinal lymph nodes. Skin Warm and dry. No bruising and no rash noted. No erythema. No pallor. Respiratory Lungs are clear to auscultation bilaterally without wheezes, rales or rhonchi, normal air exchange without accessory muscle use. CVS Normal rate, regular rhythm and normal S1 and S2. No murmurs, gallops, or rubs. Abdomen Soft, nontender and nondistended, normoactive bowel sounds. No palpable mass. No hepatosplenomegaly. Neuro Grossly nonfocal with no obvious sensory or motor deficits. MSK Normal range of motion in general.  No edema and no tenderness. PS ECOG = 0   Psych Appropriate mood and affect. No visits with results within 3 Day(s) from this visit. Latest known visit with results is:   Orders Only on 05/24/2022   Component Date Value Ref Range Status    Color, UA 05/24/2022 YELLOW    Final    Appearance 05/24/2022 CLEAR    Final    Specific Gravity, UA 05/24/2022 >1.030* 1.001 - 1.023 Final    pH, Urine 05/24/2022 5.5  5.0 - 9.0   Final    Protein, UA 05/24/2022 Negative  Negative mg/dL Final    Glucose, UA 05/24/2022 Negative  mg/dL Final    Ketones, Urine 05/24/2022 Negative  Negative mg/dL Final    Bilirubin Urine 05/24/2022 Negative  Negative   Final    Blood, Urine 05/24/2022 Negative  Negative   Final    Urobilinogen, Urine 05/24/2022 0.2  0.2 - 1.0 EU/dL Final    Nitrite, Urine 05/24/2022 Negative  Negative   Final    Leukocyte Esterase, Urine 05/24/2022 Negative  Negative   Final    WBC, UA 05/24/2022 TEST NOT NEEDED. /hpf Final    RBC, UA 05/24/2022 TEST NOT NEEDED. /hpf Final    Epithelial Cells UA 05/24/2022 TEST NOT NEEDED. /lpf Final    BACTERIA, URINE 05/24/2022 TEST NOT NEEDED. /hpf Final         Radiology:  CT Results (most recent):  No results found for this or any previous visit from the past 365 days.      PET Results (most recent):  No results found for this or any previous visit from the past 365 days. [facility] Results (most recent):  No results found for this or any previous visit from the past 365 days. [place]  No results found for this or any previous visit from the past 365 days. Patient Active Problem List   Diagnosis    History of myocarditis    HTN (hypertension)    Obesity (BMI 30-39. 9)    History of COVID-19    Mixed hyperlipidemia    Thrombocytopenia (HCC)         ASSESSMENT and PLAN  34 yo with a history of intermittent low plts likely lab error; normal here. Doubt sign.  Bleeding issue or ITP or marrow issue  Autoimmune, inflammatory immune def work up pending, but reassurance  Follow up 6 months and if normal, prn follow up  Get Harrison Savant levels next visit for pseudo type VW  Call with issues or concerns  Total time 70 min 50% in direct consultation about the patient's diagnosis and management  Chad Ramirez MD  Director, Adolescent Young Adult [address] [place] [address] and Blood Disorders Program  [id number] [place] [address], [address] [place] [address]  AY Phone

## 2022-06-16 NOTE — LETTER
[facility]  [address] [address]  Phone: [phone]  Fax: [phone]           Martita Owens MD, MD      Thank you for referring Kavita Wu to the [facility] clinic at [address] [place] [address]. Please see the attached clinic note for details of Justin Grijalva's assessment and plan.     Please don't hesitate to contact us with any questions and thank you again for the referral.    Best Regards,

## 2022-06-16 NOTE — PATIENT INSTRUCTIONS
Patient Instructions from Today's Visit    Reason for Visit:  New patient appointment    Plan: Your platelets are up to normal level today. We will review that rest of the lab work as it results. Because you have had no signs or symptoms of low platelets, we think that low platelet reading might have been in error. We will recheck again in 6 months just to be sure. Call us with any symptoms of low platelets- excessive bleeding or bruising, blood blisters in your mouth, petechiae     Follow Up: Follow up in 6 months    Recent Lab Results:  No visits with results within 3 Day(s) from this visit. Latest known visit with results is:   Orders Only on 05/24/2022   Component Date Value Ref Range Status    Color, UA 05/24/2022 YELLOW    Final    Appearance 05/24/2022 CLEAR    Final    Specific Gravity, UA 05/24/2022 >1.030* 1.001 - 1.023 Final    pH, Urine 05/24/2022 5.5  5.0 - 9.0   Final    Protein, UA 05/24/2022 Negative  Negative mg/dL Final    Glucose, UA 05/24/2022 Negative  mg/dL Final    Ketones, Urine 05/24/2022 Negative  Negative mg/dL Final    Bilirubin Urine 05/24/2022 Negative  Negative   Final    Blood, Urine 05/24/2022 Negative  Negative   Final    Urobilinogen, Urine 05/24/2022 0.2  0.2 - 1.0 EU/dL Final    Nitrite, Urine 05/24/2022 Negative  Negative   Final    Leukocyte Esterase, Urine 05/24/2022 Negative  Negative   Final    WBC, UA 05/24/2022 TEST NOT NEEDED. /hpf Final    RBC, UA 05/24/2022 TEST NOT NEEDED. /hpf Final    Epithelial Cells UA 05/24/2022 TEST NOT NEEDED. /lpf Final    BACTERIA, URINE 05/24/2022 TEST NOT NEEDED.   /hpf Final       Treatment Summary has been discussed and given to patient: n/a        -------------------------------------------------------------------------------------------------------------------  Please call our office at [phone] if you have any  of the following symptoms:   · Fever of 100.5 or greater  · Chills  · Shortness of breath  · Swelling or pain in one leg    After office hours an answering service is available and will contact a provider for emergencies or if you are experiencing any of the above symptoms.  Patient did express an interest in My Chart. My Chart log in information explained on the after visit summary printout at the [facility] desk.     Robert Bernabe RN

## 2022-06-17 LAB
GLYCOPROTEIN IV AB: NEGATIVE
HLA AB SER QL IA: NEGATIVE
IGA SERPL-MCNC: 331 MG/DL (ref 90–386)
IGG SERPL-MCNC: 1300 MG/DL (ref 603–1613)
IGM SERPL-MCNC: 193 MG/DL (ref 20–172)
PATH REV BLD -IMP: NORMAL
PLAT GP IA/IIA AB SER QL IA: NEGATIVE
PLAT GP IB/IX AB SER QL IA: NEGATIVE
PLAT GP IIB/IIIA AB SER QL IA: NEGATIVE

## 2022-06-22 LAB — IGE SERPL-ACNC: 106 IU/ML (ref 6–495)

## 2023-01-11 DIAGNOSIS — Z86.16 HISTORY OF COVID-19: ICD-10-CM

## 2023-01-11 DIAGNOSIS — D69.6 THROMBOCYTOPENIA (HCC): Primary | ICD-10-CM

## 2023-01-16 ENCOUNTER — OFFICE VISIT (OUTPATIENT)
Dept: FAMILY MEDICINE CLINIC | Facility: CLINIC | Age: 28
End: 2023-01-16
Payer: COMMERCIAL

## 2023-01-16 VITALS
OXYGEN SATURATION: 96 % | SYSTOLIC BLOOD PRESSURE: 142 MMHG | BODY MASS INDEX: 33.53 KG/M2 | DIASTOLIC BLOOD PRESSURE: 96 MMHG | RESPIRATION RATE: 16 BRPM | TEMPERATURE: 97.9 F | HEART RATE: 80 BPM | HEIGHT: 73 IN | WEIGHT: 253 LBS

## 2023-01-16 DIAGNOSIS — D69.6 THROMBOCYTOPENIA (HCC): ICD-10-CM

## 2023-01-16 DIAGNOSIS — E55.9 VITAMIN D DEFICIENCY: ICD-10-CM

## 2023-01-16 DIAGNOSIS — Z00.00 ROUTINE GENERAL MEDICAL EXAMINATION AT A HEALTH CARE FACILITY: Primary | ICD-10-CM

## 2023-01-16 DIAGNOSIS — Z11.59 NEED FOR HEPATITIS C SCREENING TEST: ICD-10-CM

## 2023-01-16 LAB
BILIRUBIN, URINE, POC: NEGATIVE
BLOOD URINE, POC: NEGATIVE
GLUCOSE URINE, POC: NEGATIVE
KETONES, URINE, POC: NEGATIVE
LEUKOCYTE ESTERASE, URINE, POC: NEGATIVE
NITRITE, URINE, POC: NEGATIVE
PH, URINE, POC: 5.5 (ref 4.6–8)
PROTEIN,URINE, POC: NEGATIVE
SPECIFIC GRAVITY, URINE, POC: 1.03 (ref 1–1.03)
URINALYSIS CLARITY, POC: CLEAR
URINALYSIS COLOR, POC: YELLOW
UROBILINOGEN, POC: NORMAL

## 2023-01-16 PROCEDURE — 3077F SYST BP >= 140 MM HG: CPT | Performed by: NURSE PRACTITIONER

## 2023-01-16 PROCEDURE — 3080F DIAST BP >= 90 MM HG: CPT | Performed by: NURSE PRACTITIONER

## 2023-01-16 PROCEDURE — 99395 PREV VISIT EST AGE 18-39: CPT | Performed by: NURSE PRACTITIONER

## 2023-01-16 PROCEDURE — 81002 URINALYSIS NONAUTO W/O SCOPE: CPT | Performed by: NURSE PRACTITIONER

## 2023-01-16 ASSESSMENT — ENCOUNTER SYMPTOMS
EYE ITCHING: 0
SORE THROAT: 0
CHOKING: 0
EYE PAIN: 0
SHORTNESS OF BREATH: 0
EYE DISCHARGE: 0
ALLERGIC/IMMUNOLOGIC NEGATIVE: 1
RESPIRATORY NEGATIVE: 1
CONSTIPATION: 0
VOICE CHANGE: 0
DIARRHEA: 0
EYE REDNESS: 0
RHINORRHEA: 0
EYES NEGATIVE: 1
ABDOMINAL DISTENTION: 0
COUGH: 0
PHOTOPHOBIA: 0
ANAL BLEEDING: 0
BLOOD IN STOOL: 0
WHEEZING: 0
BACK PAIN: 0
VOMITING: 0
SINUS PAIN: 0
FACIAL SWELLING: 0
ABDOMINAL PAIN: 0
GASTROINTESTINAL NEGATIVE: 1
APNEA: 0
SINUS PRESSURE: 0
RECTAL PAIN: 0
COLOR CHANGE: 0
NAUSEA: 0
TROUBLE SWALLOWING: 0
CHEST TIGHTNESS: 0
STRIDOR: 0

## 2023-01-16 ASSESSMENT — PATIENT HEALTH QUESTIONNAIRE - PHQ9
SUM OF ALL RESPONSES TO PHQ9 QUESTIONS 1 & 2: 0
1. LITTLE INTEREST OR PLEASURE IN DOING THINGS: 0
SUM OF ALL RESPONSES TO PHQ QUESTIONS 1-9: 0
2. FEELING DOWN, DEPRESSED OR HOPELESS: 0

## 2023-01-16 NOTE — PROGRESS NOTES
PROGRESS NOTE    Chief Complaint   Patient presents with    New Patient     Presenting to [facility]. SUBJECTIVE:     Vianca Vance is a very pleasant 33 y.o. male with hx of now diet controlled hypertension, myocarditis, borderline hyperlipidemia and thrombocytopenia-previously worked up by hematology, seen today in office as new patient to establish care. He is accompanied by his spouse for today's visit. Patient reports doing well overall. Reports a history of proteinuria-previously worked up by nephrology and was told that it was due to increased weight. Patient is a never smoker, rare occasional alcohol drinks, no illicit drug use. Patient reports no chest pain, no shortness of breath, no unilateral or focal weakness, no orthopnea or PND. GI and  review of systems is unremarkable. Past Medical History, Past Surgical History, Family history, Social History, and Medications were all reviewed with the patient today and updated as necessary. No current outpatient medications on file. No current facility-administered medications for this visit. No Known Allergies  Patient Active Problem List   Diagnosis    History of myocarditis    HTN (hypertension)    Obesity (BMI 30-39. 9)    History of COVID-19    Mixed hyperlipidemia    Thrombocytopenia (HCC)     Past Medical History:   Diagnosis Date    History of COVID-19     History of myocarditis     HTN (hypertension)      Past Surgical History:   Procedure Laterality Date    TONSILLECTOMY      WISDOM TOOTH EXTRACTION       Family History   Problem Relation Age of Onset    Clotting Disorder Mother     High Blood Pressure Father     Hypertension Father     Stroke Maternal Grandfather      Social History     Tobacco Use    Smoking status: Never    Smokeless tobacco: Never   Substance Use Topics    Alcohol use: Yes         REVIEW OF SYSTEM    Review of Systems   Constitutional: Negative.   Negative for activity change, appetite change, chills, diaphoresis, fatigue, fever and unexpected weight change. HENT: Negative. Negative for congestion, dental problem, drooling, ear discharge, ear pain, facial swelling, hearing loss, mouth sores, nosebleeds, postnasal drip, rhinorrhea, sinus pressure, sinus pain, sneezing, sore throat, tinnitus, trouble swallowing and voice change. Eyes: Negative. Negative for photophobia, pain, discharge, redness, itching and visual disturbance. Respiratory: Negative. Negative for apnea, cough, choking, chest tightness, shortness of breath, wheezing and stridor. Cardiovascular: Negative. Negative for chest pain, palpitations and leg swelling. Gastrointestinal: Negative. Negative for abdominal distention, abdominal pain, anal bleeding, blood in stool, constipation, diarrhea, nausea, rectal pain and vomiting. Endocrine: Negative. Negative for cold intolerance, heat intolerance, polydipsia, polyphagia and polyuria. Genitourinary: Negative. Negative for decreased urine volume, difficulty urinating, dysuria, enuresis, flank pain, frequency, genital sores, hematuria and urgency. Musculoskeletal: Negative. Negative for arthralgias, back pain, gait problem, joint swelling, myalgias, neck pain and neck stiffness. Skin: Negative. Negative for color change, pallor, rash and wound. Allergic/Immunologic: Negative. Negative for environmental allergies, food allergies and immunocompromised state. Neurological: Negative. Negative for dizziness, tremors, seizures, syncope, facial asymmetry, speech difficulty, weakness, light-headedness, numbness and headaches. Hematological: Negative. Negative for adenopathy. Does not bruise/bleed easily. Psychiatric/Behavioral: Negative. Negative for agitation, behavioral problems, confusion, decreased concentration, dysphoric mood, hallucinations, self-injury, sleep disturbance and suicidal ideas. The patient is not nervous/anxious and is not hyperactive. OBJECTIVE:  BP (!) 142/96 (Site: Left Upper Arm, Position: Sitting, Cuff Size: Large Adult)   Pulse 80   Temp 97.9 °F (36.6 °C) (Temporal)   Resp 16   Ht 6' 1\" (1.854 m)   Wt 253 lb (114.8 kg)   SpO2 96%   BMI 33.38 kg/m²      Physical Exam  Constitutional:       General: He is not in acute distress. Appearance: Normal appearance. He is not ill-appearing, toxic-appearing or diaphoretic. HENT:      Head: Normocephalic and atraumatic. Right Ear: Tympanic membrane, ear canal and external ear normal. There is no impacted cerumen. Left Ear: Tympanic membrane, ear canal and external ear normal. There is no impacted cerumen. Nose: Nose normal. No congestion or rhinorrhea. Mouth/Throat:      Mouth: Mucous membranes are moist.      Pharynx: Oropharynx is clear. Eyes:      General: No scleral icterus. Right eye: No discharge. Left eye: No discharge. Extraocular Movements: Extraocular movements intact. Conjunctiva/sclera: Conjunctivae normal.      Pupils: Pupils are equal, round, and reactive to light. Neck:      Vascular: No carotid bruit. Cardiovascular:      Rate and Rhythm: Normal rate and regular rhythm. Pulses: Normal pulses. Heart sounds: Normal heart sounds. No murmur heard. No friction rub. No gallop. Pulmonary:      Effort: Pulmonary effort is normal. No respiratory distress. Breath sounds: Normal breath sounds. No stridor. No wheezing, rhonchi or rales. Chest:      Chest wall: No tenderness. Abdominal:      General: Abdomen is flat. Bowel sounds are normal. There is no distension. Palpations: Abdomen is soft. There is no mass. Tenderness: There is no abdominal tenderness. There is no right CVA tenderness, left CVA tenderness, guarding or rebound. Negative signs include Ochoa's sign, Rovsing's sign, McBurney's sign, psoas sign and obturator sign. Hernia: No hernia is present.    Musculoskeletal:         General: Normal range of motion. Cervical back: Normal range of motion and neck supple. No rigidity or tenderness. Lymphadenopathy:      Cervical: No cervical adenopathy. Skin:     General: Skin is warm and dry. Capillary Refill: Capillary refill takes less than 2 seconds. Findings: No rash. Neurological:      General: No focal deficit present. Mental Status: He is alert and oriented to person, place, and time. Psychiatric:         Mood and Affect: Mood normal.         Behavior: Behavior normal.         Thought Content: Thought content normal.         Judgment: Judgment normal.         Medical problems and test results were reviewed with the patient today. ASSESSMENT and PLAN    1. Routine general medical examination at a health care facility  -     AMB POC URINALYSIS DIP STICK MANUAL W/O MICRO; Future  -     Lipid Panel; Future  -     Comprehensive Metabolic Panel; Future  -     CBC with Auto Differential; Future  -     TSH with Reflex; Future    Anticipatory guidance for age-seatbelts, avoid cell phone use in car (may use hands free), no texting while driving, avoid social drugs and tobacco, limit alcohol, fall safety, personal safety with appropriate use of helmets and equipment for protection, and appropriate use of sun screen and sun protection to prevent skin cancer. Encourage healthy diet and exercise daily. Pt does not meet criteria for prostate or colon cancer screening. Pt is up to date with vaccinations. 2. Vitamin D deficiency  -     Vitamin D 25 Hydroxy; Future    3. Thrombocytopenia ([address]  -     Comprehensive Metabolic Panel; Future  -     CBC with Auto Differential; Future  -     TSH with Reflex; Future    Previously worked up by hematology and was cleared. Patient reports no abnormal bleeding, bruising. We will recheck CBC today for monitoring. 4. Need for hepatitis C screening test  -     Hepatitis C Antibody;  Future      Orders Placed This Encounter Procedures    Hepatitis C Antibody     Standing Status:   Future     Number of Occurrences:   1     Standing Expiration Date:   1/16/2024    Vitamin D 25 Hydroxy     Standing Status:   Future     Number of Occurrences:   1     Standing Expiration Date:   1/16/2024    Lipid Panel     Standing Status:   Future     Number of Occurrences:   1     Standing Expiration Date:   1/16/2024    Comprehensive Metabolic Panel     Standing Status:   Future     Number of Occurrences:   1     Standing Expiration Date:   1/16/2024    CBC with Auto Differential     Standing Status:   Future     Number of Occurrences:   1     Standing Expiration Date:   1/16/2024    TSH with Reflex     Standing Status:   Future     Number of Occurrences:   1     Standing Expiration Date:   1/16/2024    AMB POC URINALYSIS DIP STICK MANUAL W/O MICRO     Standing Status:   Future     Number of Occurrences:   1     Standing Expiration Date:   1/16/2024         Elements of this note have been dictated using speech recognition software. As a result, errors of speech recognition may have occurred. On this date 1/16/2023 I have spent 30 minutes reviewing previous notes, test results and face to face with the patient discussing the diagnosis and importance of compliance with the treatment plan as well as documenting on the day of the visit. Greater than 50% of this visit was spent counseling the patient about test results, prognosis, importance of compliance, education about disease process, benefits of medications, instructions for management of acute symptoms, and follow up plans. Return in about 1 year (around 1/16/2024) for Physical with fasting labs prior.      LEXIS Hilliard - CNP

## 2023-01-17 LAB
25(OH)D3 SERPL-MCNC: 24.3 NG/ML (ref 30–100)
ALBUMIN SERPL-MCNC: 4.2 G/DL (ref 3.5–5)
ALBUMIN/GLOB SERPL: 1.1 (ref 0.4–1.6)
ALP SERPL-CCNC: 50 U/L (ref 50–136)
ALT SERPL-CCNC: 127 U/L (ref 12–65)
ANION GAP SERPL CALC-SCNC: 10 MMOL/L (ref 2–11)
AST SERPL-CCNC: 51 U/L (ref 15–37)
BASOPHILS # BLD: 0.1 K/UL (ref 0–0.2)
BASOPHILS NFR BLD: 1 % (ref 0–2)
BILIRUB SERPL-MCNC: 1.6 MG/DL (ref 0.2–1.1)
BUN SERPL-MCNC: 20 MG/DL (ref 6–23)
CALCIUM SERPL-MCNC: 9.3 MG/DL (ref 8.3–10.4)
CHLORIDE SERPL-SCNC: 106 MMOL/L (ref 101–110)
CHOLEST SERPL-MCNC: 236 MG/DL
CO2 SERPL-SCNC: 26 MMOL/L (ref 21–32)
CREAT SERPL-MCNC: 1.2 MG/DL (ref 0.8–1.5)
DIFFERENTIAL METHOD BLD: ABNORMAL
EOSINOPHIL # BLD: 0.1 K/UL (ref 0–0.8)
EOSINOPHIL NFR BLD: 1 % (ref 0.5–7.8)
ERYTHROCYTE [DISTWIDTH] IN BLOOD BY AUTOMATED COUNT: 12.8 % (ref 11.9–14.6)
GLOBULIN SER CALC-MCNC: 3.7 G/DL (ref 2.8–4.5)
GLUCOSE SERPL-MCNC: 99 MG/DL (ref 65–100)
HCT VFR BLD AUTO: 45.8 % (ref 41.1–50.3)
HCV AB SER QL: NONREACTIVE
HDLC SERPL-MCNC: 51 MG/DL (ref 40–60)
HDLC SERPL: 4.6
HGB BLD-MCNC: 15.9 G/DL (ref 13.6–17.2)
IMM GRANULOCYTES # BLD AUTO: 0 K/UL (ref 0–0.5)
IMM GRANULOCYTES NFR BLD AUTO: 0 % (ref 0–5)
LDLC SERPL CALC-MCNC: 166.2 MG/DL
LYMPHOCYTES # BLD: 2.3 K/UL (ref 0.5–4.6)
LYMPHOCYTES NFR BLD: 39 % (ref 13–44)
MCH RBC QN AUTO: 31.9 PG (ref 26.1–32.9)
MCHC RBC AUTO-ENTMCNC: 34.7 G/DL (ref 31.4–35)
MCV RBC AUTO: 91.8 FL (ref 82–102)
MONOCYTES # BLD: 0.8 K/UL (ref 0.1–1.3)
MONOCYTES NFR BLD: 13 % (ref 4–12)
NEUTS SEG # BLD: 2.6 K/UL (ref 1.7–8.2)
NEUTS SEG NFR BLD: 46 % (ref 43–78)
NRBC # BLD: 0 K/UL (ref 0–0.2)
PLATELET # BLD AUTO: 39 K/UL (ref 150–450)
PLATELET COMMENT: ADEQUATE
PMV BLD AUTO: 11.4 FL (ref 9.4–12.3)
POTASSIUM SERPL-SCNC: 3.8 MMOL/L (ref 3.5–5.1)
PROT SERPL-MCNC: 7.9 G/DL (ref 6.3–8.2)
RBC # BLD AUTO: 4.99 M/UL (ref 4.23–5.6)
RBC MORPH BLD: ABNORMAL
SODIUM SERPL-SCNC: 142 MMOL/L (ref 133–143)
TRIGL SERPL-MCNC: 94 MG/DL (ref 35–150)
TSH W FREE THYROID IF ABNORMAL: 2.54 UIU/ML (ref 0.36–3.74)
VLDLC SERPL CALC-MCNC: 18.8 MG/DL (ref 6–23)
WBC # BLD AUTO: 5.9 K/UL (ref 4.3–11.1)
WBC MORPH BLD: ABNORMAL

## 2023-01-18 ENCOUNTER — NURSE ONLY (OUTPATIENT)
Dept: FAMILY MEDICINE CLINIC | Facility: CLINIC | Age: 28
End: 2023-01-18
Payer: COMMERCIAL

## 2023-01-18 DIAGNOSIS — R73.09 OTHER ABNORMAL GLUCOSE: ICD-10-CM

## 2023-01-18 DIAGNOSIS — D69.6 THROMBOCYTOPENIA (HCC): Primary | ICD-10-CM

## 2023-01-18 DIAGNOSIS — D69.6 THROMBOCYTOPENIA (HCC): ICD-10-CM

## 2023-01-18 LAB
GRANS ABSOLUTE, POC: 3 K/UL
GRANULOCYTES %, POC: 46.3 %
HEMATOCRIT, POC: 49.1 %
HEMOGLOBIN, POC: 16 G/DL
LYMPHOCYTE %, POC: 41.5 %
LYMPHS ABSOLUTE, POC: 2.7 K/UL
MCH, POC: 31.3 PG (ref 20–?)
MCHC, POC: 32.6
MCV, POC: 96
MONOCYTE %, POC: 12.2 %
MONOCYTE, ABSOLUTE POC: 0.8 K/UL
MPV, POC: 7.6 FL
PLATELET COUNT, POC: 244 K/UL
RBC, POC: 5.11 M/UL
RDW, POC: 12.9 %
WBC, POC: 6.5 K/UL

## 2023-01-18 PROCEDURE — 85025 COMPLETE CBC W/AUTO DIFF WBC: CPT | Performed by: NURSE PRACTITIONER

## 2023-01-18 PROCEDURE — 36415 COLL VENOUS BLD VENIPUNCTURE: CPT | Performed by: NURSE PRACTITIONER

## 2023-01-19 ENCOUNTER — PATIENT MESSAGE (OUTPATIENT)
Dept: FAMILY MEDICINE CLINIC | Facility: CLINIC | Age: 28
End: 2023-01-19

## 2023-01-19 DIAGNOSIS — R79.89 ELEVATED LIVER FUNCTION TESTS: ICD-10-CM

## 2023-01-19 DIAGNOSIS — R14.0 BLOATING: Primary | ICD-10-CM

## 2023-01-19 LAB
ALBUMIN SERPL-MCNC: 4.1 G/DL (ref 3.5–5)
ALBUMIN/GLOB SERPL: 1.1 (ref 0.4–1.6)
ALP SERPL-CCNC: 49 U/L (ref 50–136)
ALT SERPL-CCNC: 122 U/L (ref 12–65)
ANION GAP SERPL CALC-SCNC: 7 MMOL/L (ref 2–11)
AST SERPL-CCNC: 46 U/L (ref 15–37)
BILIRUB SERPL-MCNC: 1.4 MG/DL (ref 0.2–1.1)
BUN SERPL-MCNC: 14 MG/DL (ref 6–23)
CALCIUM SERPL-MCNC: 9.5 MG/DL (ref 8.3–10.4)
CHLORIDE SERPL-SCNC: 106 MMOL/L (ref 101–110)
CO2 SERPL-SCNC: 26 MMOL/L (ref 21–32)
CREAT SERPL-MCNC: 1.1 MG/DL (ref 0.8–1.5)
EST. AVERAGE GLUCOSE BLD GHB EST-MCNC: 103 MG/DL
GLOBULIN SER CALC-MCNC: 3.6 G/DL (ref 2.8–4.5)
GLUCOSE SERPL-MCNC: 93 MG/DL (ref 65–100)
HBA1C MFR BLD: 5.2 % (ref 4.8–5.6)
POTASSIUM SERPL-SCNC: 4.5 MMOL/L (ref 3.5–5.1)
PROT SERPL-MCNC: 7.7 G/DL (ref 6.3–8.2)
SODIUM SERPL-SCNC: 139 MMOL/L (ref 133–143)

## 2023-01-20 NOTE — TELEPHONE ENCOUNTER
From: Deborra Lesch  To: Tyrone Harris  Sent: 1/19/2023 10:55 AM EST  Subject: Question regarding COMPREHENSIVE METABOLIC PANEL    This message is being sent by [facility] on behalf of Deborra Lesch. Chaitanya [place]! I saw your message about the CMP and the suggestion for an ultrasound. I am willing to do that.  The only symptom I am having is some bloating after I eat but none of the nausea, vomiting, diarrhea, etc.

## 2023-06-20 ENCOUNTER — TELEMEDICINE (OUTPATIENT)
Dept: FAMILY MEDICINE CLINIC | Facility: CLINIC | Age: 28
End: 2023-06-20
Payer: COMMERCIAL

## 2023-06-20 DIAGNOSIS — H10.31 ACUTE CONJUNCTIVITIS OF RIGHT EYE, UNSPECIFIED ACUTE CONJUNCTIVITIS TYPE: Primary | ICD-10-CM

## 2023-06-20 PROCEDURE — 99213 OFFICE O/P EST LOW 20 MIN: CPT | Performed by: NURSE PRACTITIONER

## 2023-06-20 RX ORDER — TOBRAMYCIN AND DEXAMETHASONE 3; 1 MG/ML; MG/ML
1 SUSPENSION/ DROPS OPHTHALMIC 4 TIMES DAILY
Qty: 5 ML | Refills: 0 | Status: SHIPPED | OUTPATIENT
Start: 2023-06-20 | End: 2023-06-27

## 2023-06-20 ASSESSMENT — ENCOUNTER SYMPTOMS
STRIDOR: 0
CONSTIPATION: 0
NAUSEA: 0
TROUBLE SWALLOWING: 0
EYE DISCHARGE: 1
RHINORRHEA: 0
DIARRHEA: 0
ALLERGIC/IMMUNOLOGIC NEGATIVE: 1
COLOR CHANGE: 0
CHEST TIGHTNESS: 0
SINUS PRESSURE: 0
EYE ITCHING: 0
PHOTOPHOBIA: 0
VOICE CHANGE: 0
WHEEZING: 0
RECTAL PAIN: 0
RESPIRATORY NEGATIVE: 1
BACK PAIN: 0
APNEA: 0
VOMITING: 0
SORE THROAT: 0
EYE PAIN: 0
SINUS PAIN: 0
GASTROINTESTINAL NEGATIVE: 1
FACIAL SWELLING: 0
ANAL BLEEDING: 0
ABDOMINAL DISTENTION: 0
COUGH: 0
EYE REDNESS: 0
BLOOD IN STOOL: 0
CHOKING: 0
SHORTNESS OF BREATH: 0
ABDOMINAL PAIN: 0

## 2023-06-20 NOTE — PROGRESS NOTES
PROGRESS NOTE        Consent:    Ramona Brumfield, was evaluated through a synchronous (real-time) audio-video encounter. The patient (or guardian if applicable) is aware that this is a billable service, which includes applicable co-pays. This Virtual Visit was conducted with patient's (and/or legal guardian's) consent. Patient identification was verified, and a caregiver was present when appropriate. The patient was located at Home: [address] [facility]  Provider was located [facility] [place] [address] [facility] [address] [place]Baptiste [address] [place] [phone]         On this date 6/20/2023 I have spent 20 minutes reviewing previous notes, test results and face to face (virtual) with the patient discussing the diagnosis and importance of compliance with the treatment plan as well as documenting on the day of the visit. . Greater than 50% of this visit was spent counseling the patient about test results, prognosis, importance of compliance, education about disease process, benefits of medications, instructions for management of acute symptoms, and follow up plans. Chief Complaint   Patient presents with    Eye Drainage       SUBJECTIVE:     Ramona Brumfield is a very pleasant  33 y.o. male , with past medical history significant for  now diet controlled hypertension, myocarditis, borderline hyperlipidemia and thrombocytopenia-previously worked up by hematology, seen virtually regarding complaints of right eye drainage that started about 1 week ago. Patient reports no trauma or injury to. He reports no discomfort or pain. No vision changes. Reports initially drainage begins as a clear discharge crust when wake up in the morning. Also reports no congestion, no fever or chills, no coughing, no sinus pressure, no nausea, no vomiting or diarrhea.       Past Medical History, Past Surgical History, Family history, Social History, and Medications were all reviewed with the patient today and updated

## 2023-08-28 ENCOUNTER — OFFICE VISIT (OUTPATIENT)
Dept: ORTHOPEDIC SURGERY | Age: 28
End: 2023-08-28

## 2023-08-28 ENCOUNTER — EVALUATION (OUTPATIENT)
Age: 28
End: 2023-08-28

## 2023-08-28 VITALS — BODY MASS INDEX: 33.37 KG/M2 | HEIGHT: 74 IN | WEIGHT: 260 LBS

## 2023-08-28 DIAGNOSIS — G56.01 CARPAL TUNNEL SYNDROME OF RIGHT WRIST: Primary | ICD-10-CM

## 2023-08-28 DIAGNOSIS — G56.21 CUBITAL TUNNEL SYNDROME ON RIGHT: ICD-10-CM

## 2023-08-28 DIAGNOSIS — M79.642 PAIN OF LEFT HAND: Primary | ICD-10-CM

## 2023-08-28 NOTE — PROGRESS NOTES
Occupational Therapy Encounter No Charge    Patient seen briefly while in clinic this date for development of home exercise program (see below) for current presentation of Left CTS/ cubital tunnel. Pt was seen for education on ergonomics, and nerve flossing. Provided HEP,   . Access Code: [email or web address]  URL: [email or web address]  Date: 08/28/2023  Prepared by: Iván Pillai    Exercises  - Seated Digit Tendon Gliding  - 5 x daily - 7 x weekly - 1 sets - 10 reps - 3 sec hold  - Median Nerve Flossing - Tray  - 2-3 x daily - 7 x weekly - 2 sets - 10 reps  - Ulnar Nerve Flossing  - 2-3 x daily - 7 x weekly - 1-2 sets - 10 reps - 3 hold    Patient Education  - Carpal Tunnel Syndrome  - Scar Massage  Precautions of current conditions and prognosis were also reviewed with the patient this date. Patient in agreement with therapist recommendations of following up with MD following EMG/Nerve conduction testing. Ramy Gunter

## 2023-08-28 NOTE — PROGRESS NOTES
The patient was prescribed and fitted with a Gelrod elbow brace for the right elbow, size L/XL. He was instructed to wear the brace in the evenings with the rods attached. Patient read and signed documenting they understand and agree to [facility]'s current DME return policy.
use of braces at nighttime. We will also have one of our hand therapist instruct him on nerve gliding exercises today. We will refer him to neurology for nerve conduction study. Once this is complete he will return and we will discuss the results and further treatment options    Patient voiced accordance and understanding of the information provided and the formulated plan. All questions were answered to the patient's satisfaction during the encounter.         Aranza Mcguire MD  Orthopaedic Surgery  08/28/23  6:19 PM

## 2024-01-11 DIAGNOSIS — E55.9 VITAMIN D DEFICIENCY: Primary | ICD-10-CM

## 2024-01-11 DIAGNOSIS — Z00.00 LABORATORY EXAMINATION ORDERED AS PART OF A COMPLETE PHYSICAL EXAMINATION: ICD-10-CM

## 2024-01-12 ENCOUNTER — NURSE ONLY (OUTPATIENT)
Dept: FAMILY MEDICINE CLINIC | Facility: CLINIC | Age: 29
End: 2024-01-12
Payer: COMMERCIAL

## 2024-01-12 DIAGNOSIS — Z00.00 LABORATORY EXAMINATION ORDERED AS PART OF A COMPLETE PHYSICAL EXAMINATION: ICD-10-CM

## 2024-01-12 DIAGNOSIS — E55.9 VITAMIN D DEFICIENCY: ICD-10-CM

## 2024-01-12 LAB
ALBUMIN SERPL-MCNC: 4.1 G/DL (ref 3.5–5)
ALBUMIN/GLOB SERPL: 1.1 (ref 0.4–1.6)
ALP SERPL-CCNC: 63 U/L (ref 50–136)
ALT SERPL-CCNC: 64 U/L (ref 12–65)
ANION GAP SERPL CALC-SCNC: 4 MMOL/L (ref 2–11)
AST SERPL-CCNC: 23 U/L (ref 15–37)
BASOPHILS # BLD: 0 K/UL (ref 0–0.2)
BASOPHILS NFR BLD: 0 % (ref 0–2)
BILIRUB SERPL-MCNC: 1.3 MG/DL (ref 0.2–1.1)
BILIRUBIN, URINE, POC: NEGATIVE
BLOOD URINE, POC: NEGATIVE
BUN SERPL-MCNC: 15 MG/DL (ref 6–23)
CALCIUM SERPL-MCNC: 9.5 MG/DL (ref 8.3–10.4)
CHLORIDE SERPL-SCNC: 107 MMOL/L (ref 103–113)
CHOLEST SERPL-MCNC: 225 MG/DL
CO2 SERPL-SCNC: 28 MMOL/L (ref 21–32)
CREAT SERPL-MCNC: 1.2 MG/DL (ref 0.8–1.5)
DIFFERENTIAL METHOD BLD: ABNORMAL
EOSINOPHIL # BLD: 0.1 K/UL (ref 0–0.8)
EOSINOPHIL NFR BLD: 1 % (ref 0.5–7.8)
ERYTHROCYTE [DISTWIDTH] IN BLOOD BY AUTOMATED COUNT: 12.6 % (ref 11.9–14.6)
GLOBULIN SER CALC-MCNC: 3.7 G/DL (ref 2.8–4.5)
GLUCOSE SERPL-MCNC: 99 MG/DL (ref 65–100)
GLUCOSE URINE, POC: NEGATIVE
HCT VFR BLD AUTO: 45.2 % (ref 41.1–50.3)
HDLC SERPL-MCNC: 51 MG/DL (ref 40–60)
HDLC SERPL: 4.4
HGB BLD-MCNC: 15.6 G/DL (ref 13.6–17.2)
IMM GRANULOCYTES # BLD AUTO: 0 K/UL (ref 0–0.5)
IMM GRANULOCYTES NFR BLD AUTO: 0 % (ref 0–5)
KETONES, URINE, POC: NEGATIVE
LDLC SERPL CALC-MCNC: 156.2 MG/DL
LEUKOCYTE ESTERASE, URINE, POC: NEGATIVE
LYMPHOCYTES # BLD: 2 K/UL (ref 0.5–4.6)
LYMPHOCYTES NFR BLD: 33 % (ref 13–44)
MCH RBC QN AUTO: 30.6 PG (ref 26.1–32.9)
MCHC RBC AUTO-ENTMCNC: 34.5 G/DL (ref 31.4–35)
MCV RBC AUTO: 88.8 FL (ref 82–102)
MONOCYTES # BLD: 0.7 K/UL (ref 0.1–1.3)
MONOCYTES NFR BLD: 11 % (ref 4–12)
NEUTS SEG # BLD: 3.2 K/UL (ref 1.7–8.2)
NEUTS SEG NFR BLD: 55 % (ref 43–78)
NITRITE, URINE, POC: NEGATIVE
NRBC # BLD: 0 K/UL (ref 0–0.2)
PH, URINE, POC: 6 (ref 4.6–8)
PLATELET # BLD AUTO: 57 K/UL (ref 150–450)
PMV BLD AUTO: 11.6 FL (ref 9.4–12.3)
POTASSIUM SERPL-SCNC: 4.4 MMOL/L (ref 3.5–5.1)
PROT SERPL-MCNC: 7.8 G/DL (ref 6.3–8.2)
PROTEIN,URINE, POC: NEGATIVE
RBC # BLD AUTO: 5.09 M/UL (ref 4.23–5.6)
SODIUM SERPL-SCNC: 139 MMOL/L (ref 136–146)
SPECIFIC GRAVITY, URINE, POC: 1.02 (ref 1–1.03)
TRIGL SERPL-MCNC: 89 MG/DL (ref 35–150)
TSH W FREE THYROID IF ABNORMAL: 1.15 UIU/ML (ref 0.36–3.74)
URINALYSIS CLARITY, POC: CLEAR
URINALYSIS COLOR, POC: YELLOW
UROBILINOGEN, POC: NORMAL
VLDLC SERPL CALC-MCNC: 17.8 MG/DL (ref 6–23)
WBC # BLD AUTO: 6 K/UL (ref 4.3–11.1)

## 2024-01-12 PROCEDURE — 81003 URINALYSIS AUTO W/O SCOPE: CPT | Performed by: NURSE PRACTITIONER

## 2024-01-13 LAB — 25(OH)D3 SERPL-MCNC: 23.4 NG/ML (ref 30–100)

## 2024-01-17 ASSESSMENT — PATIENT HEALTH QUESTIONNAIRE - PHQ9
SUM OF ALL RESPONSES TO PHQ QUESTIONS 1-9: 0
1. LITTLE INTEREST OR PLEASURE IN DOING THINGS: NOT AT ALL
SUM OF ALL RESPONSES TO PHQ9 QUESTIONS 1 & 2: 0
SUM OF ALL RESPONSES TO PHQ9 QUESTIONS 1 & 2: 0
SUM OF ALL RESPONSES TO PHQ QUESTIONS 1-9: 0
2. FEELING DOWN, DEPRESSED OR HOPELESS: 0
2. FEELING DOWN, DEPRESSED OR HOPELESS: NOT AT ALL
SUM OF ALL RESPONSES TO PHQ QUESTIONS 1-9: 0
1. LITTLE INTEREST OR PLEASURE IN DOING THINGS: 0
SUM OF ALL RESPONSES TO PHQ QUESTIONS 1-9: 0

## 2024-01-18 ENCOUNTER — OFFICE VISIT (OUTPATIENT)
Dept: FAMILY MEDICINE CLINIC | Facility: CLINIC | Age: 29
End: 2024-01-18
Payer: COMMERCIAL

## 2024-01-18 VITALS
HEIGHT: 74 IN | DIASTOLIC BLOOD PRESSURE: 78 MMHG | BODY MASS INDEX: 32.98 KG/M2 | WEIGHT: 257 LBS | HEART RATE: 80 BPM | OXYGEN SATURATION: 98 % | SYSTOLIC BLOOD PRESSURE: 120 MMHG

## 2024-01-18 DIAGNOSIS — Z00.00 ROUTINE GENERAL MEDICAL EXAMINATION AT A HEALTH CARE FACILITY: Primary | ICD-10-CM

## 2024-01-18 DIAGNOSIS — E55.9 VITAMIN D DEFICIENCY: ICD-10-CM

## 2024-01-18 DIAGNOSIS — E53.8 B12 DEFICIENCY: ICD-10-CM

## 2024-01-18 DIAGNOSIS — D69.6 THROMBOCYTOPENIA (HCC): ICD-10-CM

## 2024-01-18 LAB
FERRITIN SERPL-MCNC: 25 NG/ML (ref 8–388)
FOLATE SERPL-MCNC: 10 NG/ML (ref 3.1–17.5)
GRANS ABSOLUTE, POC: 2.9 K/UL
GRANULOCYTES %, POC: 44.4 %
HEMATOCRIT, POC: 48.7 %
HEMOGLOBIN, POC: 16 G/DL
IRON SERPL-MCNC: 151 UG/DL (ref 35–150)
LYMPHOCYTE %, POC: 44.9 %
LYMPHS ABSOLUTE, POC: 3 K/UL
MCH, POC: 30.8 PG (ref 20–?)
MCHC, POC: 32.9
MCV, POC: 93.9
MONOCYTE %, POC: 10.7 %
MONOCYTE, ABSOLUTE POC: 0.7 K/UL
MPV, POC: 7.9 FL
PLATELET COUNT, POC: 301 K/UL
RBC, POC: 5.19 M/UL
RDW, POC: 13.4 %
TRANSFERRIN SERPL-MCNC: 331 MG/DL (ref 202–364)
VIT B12 SERPL-MCNC: 406 PG/ML (ref 193–986)
WBC, POC: 6.6 K/UL

## 2024-01-18 PROCEDURE — 3078F DIAST BP <80 MM HG: CPT | Performed by: NURSE PRACTITIONER

## 2024-01-18 PROCEDURE — 3074F SYST BP LT 130 MM HG: CPT | Performed by: NURSE PRACTITIONER

## 2024-01-18 PROCEDURE — 99395 PREV VISIT EST AGE 18-39: CPT | Performed by: NURSE PRACTITIONER

## 2024-01-18 PROCEDURE — 85025 COMPLETE CBC W/AUTO DIFF WBC: CPT | Performed by: NURSE PRACTITIONER

## 2024-01-18 RX ORDER — ERGOCALCIFEROL 1.25 MG/1
50000 CAPSULE ORAL
Qty: 4 CAPSULE | Refills: 5 | Status: SHIPPED | OUTPATIENT
Start: 2024-01-18

## 2024-01-18 SDOH — ECONOMIC STABILITY: FOOD INSECURITY: WITHIN THE PAST 12 MONTHS, YOU WORRIED THAT YOUR FOOD WOULD RUN OUT BEFORE YOU GOT MONEY TO BUY MORE.: NEVER TRUE

## 2024-01-18 SDOH — ECONOMIC STABILITY: FOOD INSECURITY: WITHIN THE PAST 12 MONTHS, THE FOOD YOU BOUGHT JUST DIDN'T LAST AND YOU DIDN'T HAVE MONEY TO GET MORE.: NEVER TRUE

## 2024-01-18 SDOH — ECONOMIC STABILITY: HOUSING INSECURITY
IN THE LAST 12 MONTHS, WAS THERE A TIME WHEN YOU DID NOT HAVE A STEADY PLACE TO SLEEP OR SLEPT IN A SHELTER (INCLUDING NOW)?: NO

## 2024-01-18 SDOH — ECONOMIC STABILITY: INCOME INSECURITY: HOW HARD IS IT FOR YOU TO PAY FOR THE VERY BASICS LIKE FOOD, HOUSING, MEDICAL CARE, AND HEATING?: NOT HARD AT ALL

## 2024-01-18 ASSESSMENT — ENCOUNTER SYMPTOMS
VOMITING: 0
COUGH: 0
VOICE CHANGE: 0
SINUS PAIN: 0
SORE THROAT: 0
EYE PAIN: 0
CHEST TIGHTNESS: 0
TROUBLE SWALLOWING: 0
RECTAL PAIN: 0
FACIAL SWELLING: 0
SHORTNESS OF BREATH: 0
COLOR CHANGE: 0
WHEEZING: 0
CHOKING: 0
RESPIRATORY NEGATIVE: 1
EYES NEGATIVE: 1
ABDOMINAL DISTENTION: 0
STRIDOR: 0
EYE DISCHARGE: 0
RHINORRHEA: 0
BACK PAIN: 0
NAUSEA: 0
EYE ITCHING: 0
ABDOMINAL PAIN: 0
EYE REDNESS: 0
SINUS PRESSURE: 0
PHOTOPHOBIA: 0
DIARRHEA: 0
ALLERGIC/IMMUNOLOGIC NEGATIVE: 1
BLOOD IN STOOL: 0
CONSTIPATION: 0
GASTROINTESTINAL NEGATIVE: 1
ANAL BLEEDING: 0
APNEA: 0

## 2024-01-18 NOTE — PROGRESS NOTES
PROGRESS NOTE    Chief Complaint   Patient presents with    Annual Exam     Lab results.       SUBJECTIVE:     Justin Grijalva is a very pleasant 28 y.o. male with hx of  now diet controlled hypertension, myocarditis, borderline hyperlipidemia and thrombocytopenia-previously worked up by hematology,  seen today in office for lab results review.  He is due for his annual medical physical.  He is accompanied by his spouse and 1-year-old son for today's visit.  He reports doing well and feeling well.  He has a history of thrombocytopenia and that has been previously worked up by hematology.  He has been noted to have multiple readings of low platelets but upon repeat, it has returned to normal.  He reports no frequent bleeding episode except for occasional epistaxis maybe once every 4 months or so.  Most recently he had a nosebleed a couple weeks prior.  He was able to stop it quite promptly however.  Otherwise, he has no other concerns.  Feels well overall.  Never smoker, rare occasional alcohol use.  No illicit drug use.Patient reports no chest pain, no shortness of breath, no unilateral or focal weakness, no orthopnea or PND.  GI and  review of systems is unremarkable.         Past Medical History, Past Surgical History, Family history, Social History, and Medications were all reviewed with the patient today and updated as necessary.       Current Outpatient Medications   Medication Sig Dispense Refill    vitamin D (ERGOCALCIFEROL) 1.25 MG (83019 UT) CAPS capsule Take 1 capsule by mouth every 7 days With food for vitamin D supplementation 4 capsule 5     No current facility-administered medications for this visit.     No Known Allergies  Patient Active Problem List   Diagnosis    History of myocarditis    HTN (hypertension)    Obesity (BMI 30-39.9)    History of COVID-19    Mixed hyperlipidemia    Thrombocytopenia (HCC)     Past Medical History:   Diagnosis Date    History of COVID-19     History of

## 2024-02-28 ENCOUNTER — APPOINTMENT (OUTPATIENT)
Dept: ULTRASOUND IMAGING | Age: 29
End: 2024-02-28
Payer: COMMERCIAL

## 2024-02-28 ENCOUNTER — NURSE TRIAGE (OUTPATIENT)
Dept: OTHER | Facility: CLINIC | Age: 29
End: 2024-02-28

## 2024-02-28 ENCOUNTER — HOSPITAL ENCOUNTER (EMERGENCY)
Age: 29
Discharge: HOME | End: 2024-02-28
Attending: EMERGENCY MEDICINE
Payer: COMMERCIAL

## 2024-02-28 VITALS
BODY MASS INDEX: 32.08 KG/M2 | DIASTOLIC BLOOD PRESSURE: 72 MMHG | RESPIRATION RATE: 17 BRPM | WEIGHT: 250 LBS | SYSTOLIC BLOOD PRESSURE: 134 MMHG | OXYGEN SATURATION: 99 % | HEIGHT: 74 IN | TEMPERATURE: 98.4 F | HEART RATE: 76 BPM

## 2024-02-28 DIAGNOSIS — I86.1 VARICOCELE: Primary | ICD-10-CM

## 2024-02-28 DIAGNOSIS — N50.3 EPIDIDYMAL CYST: ICD-10-CM

## 2024-02-28 DIAGNOSIS — N50.819 PAIN IN TESTICLE, UNSPECIFIED LATERALITY: ICD-10-CM

## 2024-02-28 LAB
APPEARANCE UR: CLEAR
BILIRUB UR QL: NEGATIVE
COLOR UR: YELLOW
GLUCOSE UR STRIP.AUTO-MCNC: NEGATIVE MG/DL
HGB UR QL STRIP: NEGATIVE
KETONES UR QL STRIP.AUTO: NEGATIVE MG/DL
LEUKOCYTE ESTERASE UR QL STRIP.AUTO: NEGATIVE
NITRITE UR QL STRIP.AUTO: NEGATIVE
PH UR STRIP: 5.5 (ref 5–9)
PROT UR STRIP-MCNC: NEGATIVE MG/DL
SP GR UR REFRACTOMETRY: >=1.03 (ref 1–1.02)

## 2024-02-28 PROCEDURE — 99284 EMERGENCY DEPT VISIT MOD MDM: CPT

## 2024-02-28 PROCEDURE — 76870 US EXAM SCROTUM: CPT

## 2024-02-28 PROCEDURE — 81003 URINALYSIS AUTO W/O SCOPE: CPT

## 2024-02-28 ASSESSMENT — LIFESTYLE VARIABLES
HOW MANY STANDARD DRINKS CONTAINING ALCOHOL DO YOU HAVE ON A TYPICAL DAY: PATIENT DOES NOT DRINK
HOW OFTEN DO YOU HAVE A DRINK CONTAINING ALCOHOL: NEVER

## 2024-02-28 ASSESSMENT — PAIN SCALES - GENERAL
PAINLEVEL_OUTOF10: 4
PAINLEVEL_OUTOF10: 2

## 2024-02-28 ASSESSMENT — PAIN - FUNCTIONAL ASSESSMENT
PAIN_FUNCTIONAL_ASSESSMENT: 0-10
PAIN_FUNCTIONAL_ASSESSMENT: 0-10

## 2024-02-28 ASSESSMENT — PAIN DESCRIPTION - LOCATION: LOCATION: SCROTUM

## 2024-02-28 NOTE — ED TRIAGE NOTES
Pt to the ED from home with wife with c/o of left testicle pain and edema since Monday. Pt states that he has sudden onset of pain.

## 2024-02-28 NOTE — TELEPHONE ENCOUNTER
Location of patient: [place]    Subjective: Caller states \"left testicle swelling, pain, redness\" Wife calling, patient nearby.     Current Symptoms: See above    Onset: 3 days ago; worsening    Associated Symptoms: NA    Pain Severity: 4/10; sharp; constant, waxing and waning    Temperature: denies fever     What has been tried: nothing    LMP: NA Pregnant: NA    Recommended disposition: Go to ED Now    Care advice provided, patient verbalizes understanding; denies any other questions or concerns; instructed to call back for any new or worsening symptoms.    Patient/caller agrees to proceed to [facility] Emergency Department    Attention Provider:  Thank you for allowing me to participate in the care of your patient.  The patient was connected to triage in response to symptoms provided.   Please do not respond through this encounter as the response is not directed to a shared pool.    Reason for Disposition   Swollen scrotum    Protocols used: Scrotum Pain-ADULT-[place]

## 2024-02-28 NOTE — ED PROVIDER NOTES
intact  Musculoskeletal:         General: No swelling. Normal range of motion.      Cervical back: Normal range of motion.   Skin:     General: Skin is warm and dry.   Neurological:      General: No focal deficit present.      Mental Status: He is alert and oriented to person, place, and time.   Psychiatric:         Mood and Affect: Mood normal.         Behavior: Behavior normal.        Procedures     Procedures    Orders Placed This Encounter   Procedures    US SCROTUM AND TESTICLES    Urinalysis w rflx microscopic    [facility]        Medications given during this emergency department visit:  Medications - No data to display    Discharge Medication List as of 2/28/2024  2:25 PM           Past Medical History:   Diagnosis Date    History of COVID-19     History of myocarditis     HTN (hypertension)         Past Surgical History:   Procedure Laterality Date    TONSILLECTOMY      WISDOM TOOTH EXTRACTION          Social History     Socioeconomic History    Marital status:    Tobacco Use    Smoking status: Never    Smokeless tobacco: Never   Vaping Use    Vaping Use: Never used   Substance and Sexual Activity    Alcohol use: Yes     Comment: Rarely    Drug use: Never    Sexual activity: Yes     Partners: Female     Social Determinants of Health     Financial Resource Strain: Low Risk  (1/18/2024)    Overall Financial Resource Strain (CARDIA)     Difficulty of Paying Living Expenses: Not hard at all   Food Insecurity: No Food Insecurity (1/18/2024)    Hunger Vital Sign     Worried About Running Out of Food in the Last Year: Never true     Ran Out of Food in the Last Year: Never true   Transportation Needs: Unknown (1/18/2024)    PRAPARE - Transportation     Lack of Transportation (Non-Medical): No   Physical Activity: Sufficiently Active (4/29/2022)    Exercise Vital Sign     Days of Exercise per Week: 5 days     Minutes of Exercise per Session: 150+ min   Intimate Partner

## 2024-02-28 NOTE — ED NOTES
I have reviewed discharge instructions with the patient and spouse.  The patient and spouse verbalized understanding.    Patient left ED via Discharge Method: ambulatory to Home with wife    Opportunity for questions and clarification provided.       Patient given 0 scripts.         To continue your aftercare when you leave the hospital, you may receive an automated call from our care team to check in on how you are doing.  This is a free service and part of our promise to provide the best care and service to meet your aftercare needs.” If you have questions, or wish to unsubscribe from this service please call [phone].  Thank you for Choosing our [facility] Emergency Department.

## 2024-03-06 ENCOUNTER — OFFICE VISIT (OUTPATIENT)
Dept: UROLOGY | Age: 29
End: 2024-03-06
Payer: COMMERCIAL

## 2024-03-06 DIAGNOSIS — N50.3 EPIDIDYMAL CYST: Primary | ICD-10-CM

## 2024-03-06 DIAGNOSIS — I86.1 VARICOCELE: ICD-10-CM

## 2024-03-06 PROCEDURE — 99204 OFFICE O/P NEW MOD 45 MIN: CPT | Performed by: UROLOGY

## 2024-03-06 RX ORDER — SULFAMETHOXAZOLE AND TRIMETHOPRIM 800; 160 MG/1; MG/1
1 TABLET ORAL 2 TIMES DAILY
Qty: 14 TABLET | Refills: 0 | Status: SHIPPED | OUTPATIENT
Start: 2024-03-06 | End: 2024-03-13

## 2024-03-06 ASSESSMENT — ENCOUNTER SYMPTOMS
SKIN LESIONS: 0
COUGH: 0
EYE DISCHARGE: 0
WHEEZING: 0
VOMITING: 0
DIARRHEA: 0
NAUSEA: 0
CONSTIPATION: 0
ABDOMINAL PAIN: 0
BLOOD IN STOOL: 0
SHORTNESS OF BREATH: 0
EYE PAIN: 0
BACK PAIN: 0
HEARTBURN: 0
INDIGESTION: 0

## 2024-03-06 NOTE — PROGRESS NOTES
Expenses: Not hard at all   Food Insecurity: No Food Insecurity (1/18/2024)    Hunger Vital Sign     Worried About Running Out of Food in the Last Year: Never true     Ran Out of Food in the Last Year: Never true   Transportation Needs: Unknown (1/18/2024)    PRAPARE - Transportation     Lack of Transportation (Medical): Not on file     Lack of Transportation (Non-Medical): No   Physical Activity: Sufficiently Active (4/29/2022)    Exercise Vital Sign     Days of Exercise per Week: 5 days     Minutes of Exercise per Session: 150+ min   Stress: Not on file   Social Connections: Not on file   Intimate Partner Violence: Not At Risk (4/29/2022)    Humiliation, Afraid, Rape, and Kick questionnaire     Fear of Current or Ex-Partner: No     Emotionally Abused: No     Physically Abused: No     Sexually Abused: No   Housing Stability: Unknown (1/18/2024)    Housing Stability Vital Sign     Unable to Pay for Housing in the Last Year: Not on file     Number of Places Lived in the Last Year: Not on file     Unstable Housing in the Last Year: No     Family History   Problem Relation Age of Onset    Clotting Disorder Mother     High Blood Pressure Father     Hypertension Father     Stroke Maternal Grandfather        Review of Systems  Review of Systems  Constitutional:   Negative for fever, chills, appetite change, malaise/fatigue, headaches and weight loss.  Skin:  Negative for skin lesions, rash and itching.  Eyes:  Negative for visual disturbance, eye pain and eye discharge.  ENT: HENT negative Negative for difficulty articulating words, pain swallowing, high frequency hearing loss and dry mouth.  Respiratory:  Negative for cough, blood in sputum, shortness of breath and wheezing.  Cardiovascular: Positive for hypertension. Negative for chest pain, irregular heartbeat, leg pain, leg swelling, regular rate and rhythm and varicose veins.  GI:  Negative for nausea, vomiting, abdominal pain, blood in stool, constipation, diarrhea,

## 2024-05-15 ENCOUNTER — NURSE TRIAGE (OUTPATIENT)
Dept: OTHER | Facility: CLINIC | Age: 29
End: 2024-05-15

## 2024-05-15 ENCOUNTER — HOSPITAL ENCOUNTER (EMERGENCY)
Age: 29
Discharge: HOME | End: 2024-05-15
Attending: EMERGENCY MEDICINE
Payer: COMMERCIAL

## 2024-05-15 VITALS
DIASTOLIC BLOOD PRESSURE: 79 MMHG | HEIGHT: 74 IN | WEIGHT: 245 LBS | RESPIRATION RATE: 18 BRPM | BODY MASS INDEX: 31.44 KG/M2 | TEMPERATURE: 98.3 F | OXYGEN SATURATION: 98 % | SYSTOLIC BLOOD PRESSURE: 144 MMHG | HEART RATE: 79 BPM

## 2024-05-15 DIAGNOSIS — T65.91XA ACCIDENTAL INGESTION OF SUBSTANCE, INITIAL ENCOUNTER: Primary | ICD-10-CM

## 2024-05-15 PROCEDURE — 99282 EMERGENCY DEPT VISIT SF MDM: CPT

## 2024-05-15 ASSESSMENT — ENCOUNTER SYMPTOMS
ABDOMINAL PAIN: 0
NAUSEA: 0

## 2024-05-15 ASSESSMENT — PAIN DESCRIPTION - LOCATION: LOCATION: HEAD

## 2024-05-15 ASSESSMENT — PAIN - FUNCTIONAL ASSESSMENT: PAIN_FUNCTIONAL_ASSESSMENT: 0-10

## 2024-05-15 ASSESSMENT — PAIN DESCRIPTION - DESCRIPTORS: DESCRIPTORS: ACHING

## 2024-05-15 ASSESSMENT — PAIN SCALES - GENERAL: PAINLEVEL_OUTOF10: 2

## 2024-05-15 ASSESSMENT — LIFESTYLE VARIABLES
HOW OFTEN DO YOU HAVE A DRINK CONTAINING ALCOHOL: MONTHLY OR LESS
HOW MANY STANDARD DRINKS CONTAINING ALCOHOL DO YOU HAVE ON A TYPICAL DAY: 1 OR 2

## 2024-05-15 NOTE — ED NOTES
I have reviewed discharge instructions with the patient.  The patient verbalized understanding.    Patient left ED via Discharge Method: ambulatory to Home with wife and son.    Opportunity for questions and clarification provided.       Patient given 0 scripts.                Calli Muhammad RN  05/15/24 [id number]

## 2024-05-15 NOTE — DISCHARGE INSTRUCTIONS
Please return with any fevers, vomiting, worsening symptoms, or additional concerns.    Please follow-up with your primary care doctor as needed.

## 2024-05-15 NOTE — ED TRIAGE NOTES
Pt arrives w/ cc of poison control and headache today. Pt found son sucking on a bottle of spectracide (weed and grass killer). Pt was assessing whether it was consumed in tube and may have consumed it as well. Pt has not shown any reaction or symptoms. Pt A&O x 4

## 2024-05-16 NOTE — ED PROVIDER NOTES
Emergency Department Provider Note       PCP: Lore Lopez, APRN - CNP   Age: 29 y.o.   Sex: male     DISPOSITION Decision To Discharge 05/15/2024 07:27:19 PM       ICD-10-CM    1. Accidental ingestion of substance, initial encounter  T65.91XA           Medical Decision Making     Dad was able to show me the MSDS of the exact product that he he used.  It says it is not significantly hazardous with ingestion and that no specific action is needed.  I also think with the tiny amount that he and the baby could have gotten there is no significant exposure     1 acute complicated illness or injury.  Shared medical decision making was utilized in creating the patients health plan today.    I independently ordered and reviewed each unique test.                     History     29-year-old gentleman presents with concerns about putting a [employer] side weed killer product in his mouth.  His 16-month-old son was seen here for the same thing.  Dad says that after he saw his son put the nozzle in his mouth he quickly took it away from him and put it in his own mouth to see how much could have come out.  He says there was not more than a couple of drops.  Dad says this happened about 5:30 PM and that he nor the baby have had any symptoms.    Elements of this note were created using speech recognition software.  As such, errors of speech recognition may be present.        ROS     Review of Systems   Constitutional:  Negative for chills and fever.   Gastrointestinal:  Negative for abdominal pain, nausea and vomiting.   Neurological:  Negative for seizures.        Physical Exam     Vitals signs and nursing note reviewed:  Vitals:    05/15/24 1907   BP: (!) 144/79   Pulse: 79   Resp: 18   Temp: 98.3 °F (36.8 °C)   TempSrc: Oral   SpO2: 98%   Weight: 111.1 kg (245 lb)   Height: 1.88 m (6' 2\")      Physical Exam  Constitutional:       Appearance: Normal appearance.   HENT:      Mouth/Throat:      Comments: No mucosal membrane

## 2024-06-24 ENCOUNTER — NURSE ONLY (OUTPATIENT)
Dept: FAMILY MEDICINE CLINIC | Facility: CLINIC | Age: 29
End: 2024-06-24

## 2024-06-24 VITALS
BODY MASS INDEX: 32.7 KG/M2 | SYSTOLIC BLOOD PRESSURE: 128 MMHG | HEART RATE: 73 BPM | OXYGEN SATURATION: 97 % | HEIGHT: 74 IN | WEIGHT: 254.8 LBS | DIASTOLIC BLOOD PRESSURE: 88 MMHG

## 2024-06-24 DIAGNOSIS — E55.9 VITAMIN D DEFICIENCY: ICD-10-CM

## 2024-06-24 DIAGNOSIS — Z13.1 SCREENING FOR DIABETES MELLITUS: ICD-10-CM

## 2024-06-24 DIAGNOSIS — Z00.00 LABORATORY EXAMINATION ORDERED AS PART OF A COMPLETE PHYSICAL EXAMINATION: Primary | ICD-10-CM

## 2024-06-24 DIAGNOSIS — Z00.00 LABORATORY EXAMINATION ORDERED AS PART OF A COMPLETE PHYSICAL EXAMINATION: ICD-10-CM

## 2024-06-24 DIAGNOSIS — Z01.30 BP CHECK: Primary | ICD-10-CM

## 2024-06-24 LAB
25(OH)D3 SERPL-MCNC: 32.2 NG/ML (ref 30–100)
ALBUMIN SERPL-MCNC: 4 G/DL (ref 3.5–5)
ALBUMIN/GLOB SERPL: 1.2 (ref 1–1.9)
ALP SERPL-CCNC: 57 U/L (ref 40–129)
ALT SERPL-CCNC: 33 U/L (ref 12–65)
ANION GAP SERPL CALC-SCNC: 8 MMOL/L (ref 9–18)
AST SERPL-CCNC: 29 U/L (ref 15–37)
BASOPHILS # BLD: 0 K/UL (ref 0–0.2)
BASOPHILS NFR BLD: 1 % (ref 0–2)
BILIRUB SERPL-MCNC: 0.6 MG/DL (ref 0–1.2)
BUN SERPL-MCNC: 16 MG/DL (ref 6–23)
CALCIUM SERPL-MCNC: 9.6 MG/DL (ref 8.8–10.2)
CHLORIDE SERPL-SCNC: 104 MMOL/L (ref 98–107)
CHOLEST SERPL-MCNC: 250 MG/DL (ref 0–200)
CO2 SERPL-SCNC: 27 MMOL/L (ref 20–28)
CREAT SERPL-MCNC: 1.11 MG/DL (ref 0.8–1.3)
DIFFERENTIAL METHOD BLD: ABNORMAL
EOSINOPHIL # BLD: 0.1 K/UL (ref 0–0.8)
EOSINOPHIL NFR BLD: 1 % (ref 0.5–7.8)
ERYTHROCYTE [DISTWIDTH] IN BLOOD BY AUTOMATED COUNT: 13.2 % (ref 11.9–14.6)
EST. AVERAGE GLUCOSE BLD GHB EST-MCNC: 118 MG/DL
GLOBULIN SER CALC-MCNC: 3.4 G/DL (ref 2.3–3.5)
GLUCOSE SERPL-MCNC: 95 MG/DL (ref 70–99)
HBA1C MFR BLD: 5.7 % (ref 0–5.6)
HCT VFR BLD AUTO: 45.1 % (ref 41.1–50.3)
HDLC SERPL-MCNC: 49 MG/DL (ref 40–60)
HDLC SERPL: 5.1 (ref 0–5)
HGB BLD-MCNC: 15 G/DL (ref 13.6–17.2)
IMM GRANULOCYTES # BLD AUTO: 0 K/UL (ref 0–0.5)
IMM GRANULOCYTES NFR BLD AUTO: 0 % (ref 0–5)
LDLC SERPL CALC-MCNC: 177 MG/DL (ref 0–100)
LYMPHOCYTES # BLD: 2 K/UL (ref 0.5–4.6)
LYMPHOCYTES NFR BLD: 37 % (ref 13–44)
MCH RBC QN AUTO: 29.1 PG (ref 26.1–32.9)
MCHC RBC AUTO-ENTMCNC: 33.3 G/DL (ref 31.4–35)
MCV RBC AUTO: 87.6 FL (ref 82–102)
MONOCYTES # BLD: 0.6 K/UL (ref 0.1–1.3)
MONOCYTES NFR BLD: 11 % (ref 4–12)
NEUTS SEG # BLD: 2.7 K/UL (ref 1.7–8.2)
NEUTS SEG NFR BLD: 50 % (ref 43–78)
NRBC # BLD: 0 K/UL (ref 0–0.2)
PLATELET # BLD AUTO: 74 K/UL (ref 150–450)
PMV BLD AUTO: 12.3 FL (ref 9.4–12.3)
POTASSIUM SERPL-SCNC: 4.5 MMOL/L (ref 3.5–5.1)
PROT SERPL-MCNC: 7.4 G/DL (ref 6.3–8.2)
RBC # BLD AUTO: 5.15 M/UL (ref 4.23–5.6)
SODIUM SERPL-SCNC: 139 MMOL/L (ref 136–145)
TRIGL SERPL-MCNC: 117 MG/DL (ref 0–150)
TSH W FREE THYROID IF ABNORMAL: 2.03 UIU/ML (ref 0.27–4.2)
VLDLC SERPL CALC-MCNC: 23 MG/DL (ref 6–23)
WBC # BLD AUTO: 5.5 K/UL (ref 4.3–11.1)

## 2025-01-13 DIAGNOSIS — Z13.1 SCREENING FOR DIABETES MELLITUS: ICD-10-CM

## 2025-01-13 DIAGNOSIS — E55.9 VITAMIN D DEFICIENCY: ICD-10-CM

## 2025-01-13 DIAGNOSIS — Z00.00 LABORATORY EXAMINATION ORDERED AS PART OF A COMPLETE PHYSICAL EXAMINATION: Primary | ICD-10-CM

## 2025-01-15 ENCOUNTER — LAB (OUTPATIENT)
Dept: FAMILY MEDICINE CLINIC | Facility: CLINIC | Age: 30
End: 2025-01-15

## 2025-01-15 DIAGNOSIS — E55.9 VITAMIN D DEFICIENCY: ICD-10-CM

## 2025-01-15 DIAGNOSIS — Z00.00 LABORATORY EXAMINATION ORDERED AS PART OF A COMPLETE PHYSICAL EXAMINATION: ICD-10-CM

## 2025-01-15 DIAGNOSIS — Z13.1 SCREENING FOR DIABETES MELLITUS: ICD-10-CM

## 2025-01-15 LAB
25(OH)D3 SERPL-MCNC: 27.8 NG/ML (ref 30–100)
ALBUMIN SERPL-MCNC: 4 G/DL (ref 3.5–5)
ALBUMIN/GLOB SERPL: 1.1 (ref 1–1.9)
ALP SERPL-CCNC: 54 U/L (ref 40–129)
ALT SERPL-CCNC: 69 U/L (ref 8–55)
ANION GAP SERPL CALC-SCNC: 10 MMOL/L (ref 7–16)
AST SERPL-CCNC: 37 U/L (ref 15–37)
BASOPHILS # BLD: 0.03 K/UL (ref 0–0.2)
BASOPHILS NFR BLD: 0.5 % (ref 0–2)
BILIRUB SERPL-MCNC: 1 MG/DL (ref 0–1.2)
BUN SERPL-MCNC: 18 MG/DL (ref 6–23)
CALCIUM SERPL-MCNC: 9.6 MG/DL (ref 8.8–10.2)
CHLORIDE SERPL-SCNC: 104 MMOL/L (ref 98–107)
CHOLEST SERPL-MCNC: 247 MG/DL (ref 0–200)
CO2 SERPL-SCNC: 28 MMOL/L (ref 20–29)
CREAT SERPL-MCNC: 1.24 MG/DL (ref 0.8–1.3)
DIFFERENTIAL METHOD BLD: ABNORMAL
EOSINOPHIL # BLD: 0.08 K/UL (ref 0–0.8)
EOSINOPHIL NFR BLD: 1.2 % (ref 0.5–7.8)
ERYTHROCYTE [DISTWIDTH] IN BLOOD BY AUTOMATED COUNT: 12.2 % (ref 11.9–14.6)
EST. AVERAGE GLUCOSE BLD GHB EST-MCNC: 105 MG/DL
GLOBULIN SER CALC-MCNC: 3.5 G/DL (ref 2.3–3.5)
GLUCOSE SERPL-MCNC: 91 MG/DL (ref 70–99)
HBA1C MFR BLD: 5.3 % (ref 0–5.6)
HCT VFR BLD AUTO: 46.5 % (ref 41.1–50.3)
HDLC SERPL-MCNC: 45 MG/DL (ref 40–60)
HDLC SERPL: 5.5 (ref 0–5)
HGB BLD-MCNC: 16.4 G/DL (ref 13.6–17.2)
IMM GRANULOCYTES # BLD AUTO: 0.01 K/UL (ref 0–0.5)
IMM GRANULOCYTES NFR BLD AUTO: 0.2 % (ref 0–5)
LDLC SERPL CALC-MCNC: 172 MG/DL (ref 0–100)
LYMPHOCYTES # BLD: 2.41 K/UL (ref 0.5–4.6)
LYMPHOCYTES NFR BLD: 37.4 % (ref 13–44)
MCH RBC QN AUTO: 31.4 PG (ref 26.1–32.9)
MCHC RBC AUTO-ENTMCNC: 35.3 G/DL (ref 31.4–35)
MCV RBC AUTO: 88.9 FL (ref 82–102)
MONOCYTES # BLD: 0.79 K/UL (ref 0.1–1.3)
MONOCYTES NFR BLD: 12.3 % (ref 4–12)
NEUTS SEG # BLD: 3.12 K/UL (ref 1.7–8.2)
NEUTS SEG NFR BLD: 48.4 % (ref 43–78)
NRBC # BLD: 0 K/UL (ref 0–0.2)
PLATELET # BLD AUTO: 44 K/UL (ref 150–450)
PMV BLD AUTO: 11.8 FL (ref 9.4–12.3)
POTASSIUM SERPL-SCNC: 4.3 MMOL/L (ref 3.5–5.1)
PROT SERPL-MCNC: 7.4 G/DL (ref 6.3–8.2)
RBC # BLD AUTO: 5.23 M/UL (ref 4.23–5.6)
SODIUM SERPL-SCNC: 142 MMOL/L (ref 136–145)
TRIGL SERPL-MCNC: 153 MG/DL (ref 0–150)
TSH W FREE THYROID IF ABNORMAL: 1.34 UIU/ML (ref 0.27–4.2)
VLDLC SERPL CALC-MCNC: 31 MG/DL (ref 6–23)
WBC # BLD AUTO: 6.4 K/UL (ref 4.3–11.1)

## 2025-01-21 ASSESSMENT — PATIENT HEALTH QUESTIONNAIRE - PHQ9
SUM OF ALL RESPONSES TO PHQ QUESTIONS 1-9: 0
1. LITTLE INTEREST OR PLEASURE IN DOING THINGS: NOT AT ALL
SUM OF ALL RESPONSES TO PHQ9 QUESTIONS 1 & 2: 0
SUM OF ALL RESPONSES TO PHQ QUESTIONS 1-9: 0
SUM OF ALL RESPONSES TO PHQ QUESTIONS 1-9: 0
2. FEELING DOWN, DEPRESSED OR HOPELESS: NOT AT ALL
SUM OF ALL RESPONSES TO PHQ9 QUESTIONS 1 & 2: 0
SUM OF ALL RESPONSES TO PHQ QUESTIONS 1-9: 0
1. LITTLE INTEREST OR PLEASURE IN DOING THINGS: NOT AT ALL
2. FEELING DOWN, DEPRESSED OR HOPELESS: NOT AT ALL

## 2025-01-24 ENCOUNTER — OFFICE VISIT (OUTPATIENT)
Dept: FAMILY MEDICINE CLINIC | Facility: CLINIC | Age: 30
End: 2025-01-24

## 2025-01-24 VITALS
SYSTOLIC BLOOD PRESSURE: 138 MMHG | WEIGHT: 263 LBS | DIASTOLIC BLOOD PRESSURE: 90 MMHG | HEART RATE: 85 BPM | BODY MASS INDEX: 33.75 KG/M2 | OXYGEN SATURATION: 98 % | HEIGHT: 74 IN

## 2025-01-24 DIAGNOSIS — E55.9 VITAMIN D DEFICIENCY: ICD-10-CM

## 2025-01-24 DIAGNOSIS — G47.00 INSOMNIA, UNSPECIFIED TYPE: ICD-10-CM

## 2025-01-24 DIAGNOSIS — R07.9 CHEST PAIN, UNSPECIFIED TYPE: ICD-10-CM

## 2025-01-24 DIAGNOSIS — D69.6 THROMBOCYTOPENIA (HCC): ICD-10-CM

## 2025-01-24 DIAGNOSIS — Z00.00 ROUTINE GENERAL MEDICAL EXAMINATION AT A HEALTH CARE FACILITY: Primary | ICD-10-CM

## 2025-01-24 DIAGNOSIS — E78.5 BORDERLINE HYPERLIPIDEMIA: ICD-10-CM

## 2025-01-24 DIAGNOSIS — R03.0 ELEVATED BLOOD PRESSURE READING WITHOUT DIAGNOSIS OF HYPERTENSION: ICD-10-CM

## 2025-01-24 LAB
BILIRUBIN, URINE, POC: NEGATIVE
BLOOD URINE, POC: NEGATIVE
GLUCOSE URINE, POC: NEGATIVE
GRANS ABSOLUTE, POC: 2.6 K/UL
GRANULOCYTES %, POC: 41.2 %
HEMATOCRIT, POC: 50.8 %
HEMOGLOBIN, POC: 16.2 G/DL
KETONES, URINE, POC: NEGATIVE
LEUKOCYTE ESTERASE, URINE, POC: NEGATIVE
LYMPHOCYTE %, POC: 45.7 %
LYMPHS ABSOLUTE, POC: 2.9 K/UL
MCH, POC: 31.2 PG (ref 20–?)
MCHC, POC: 31.9
MCV, POC: 97.8
MONOCYTE %, POC: 13.1 %
MONOCYTE, ABSOLUTE POC: 0.8 K/UL
MPV, POC: 7.7 FL
NITRITE, URINE, POC: NEGATIVE
PH, URINE, POC: 6 (ref 4.6–8)
PLATELET COUNT, POC: 314 K/UL
PROTEIN,URINE, POC: NEGATIVE
RBC, POC: 5.19 M/UL
RDW, POC: 13.2 %
SPECIFIC GRAVITY, URINE, POC: 1.03 (ref 1–1.03)
URINALYSIS CLARITY, POC: CLEAR
URINALYSIS COLOR, POC: YELLOW
UROBILINOGEN, POC: NORMAL
WBC, POC: 6.4 K/UL

## 2025-01-24 RX ORDER — ERGOCALCIFEROL 1.25 MG/1
50000 CAPSULE, LIQUID FILLED ORAL
Qty: 4 CAPSULE | Refills: 5 | Status: SHIPPED | OUTPATIENT
Start: 2025-01-24

## 2025-01-24 RX ORDER — TRAZODONE HYDROCHLORIDE 50 MG/1
50 TABLET, FILM COATED ORAL
Qty: 90 TABLET | Refills: 1 | Status: SHIPPED | OUTPATIENT
Start: 2025-01-24

## 2025-01-24 SDOH — ECONOMIC STABILITY: FOOD INSECURITY: WITHIN THE PAST 12 MONTHS, YOU WORRIED THAT YOUR FOOD WOULD RUN OUT BEFORE YOU GOT MONEY TO BUY MORE.: NEVER TRUE

## 2025-01-24 SDOH — ECONOMIC STABILITY: FOOD INSECURITY: WITHIN THE PAST 12 MONTHS, THE FOOD YOU BOUGHT JUST DIDN'T LAST AND YOU DIDN'T HAVE MONEY TO GET MORE.: NEVER TRUE

## 2025-01-24 ASSESSMENT — ENCOUNTER SYMPTOMS
ALLERGIC/IMMUNOLOGIC NEGATIVE: 1
RESPIRATORY NEGATIVE: 1
SHORTNESS OF BREATH: 0
RHINORRHEA: 0
VOICE CHANGE: 0
CONSTIPATION: 0
GASTROINTESTINAL NEGATIVE: 1
NAUSEA: 0
BACK PAIN: 0
COUGH: 0
CHEST TIGHTNESS: 0
APNEA: 0
SORE THROAT: 0
SINUS PAIN: 0
CHOKING: 0
SINUS PRESSURE: 0
BLOOD IN STOOL: 0
EYE DISCHARGE: 0
ABDOMINAL DISTENTION: 0
DIARRHEA: 0
TROUBLE SWALLOWING: 0
COLOR CHANGE: 0
FACIAL SWELLING: 0
ANAL BLEEDING: 0
WHEEZING: 0
STRIDOR: 0
EYE ITCHING: 0
ABDOMINAL PAIN: 0
EYE PAIN: 0
RECTAL PAIN: 0
EYE REDNESS: 0
VOMITING: 0
PHOTOPHOBIA: 0
EYES NEGATIVE: 1

## 2025-01-24 NOTE — PROGRESS NOTES
PROGRESS NOTE    Chief Complaint   Patient presents with    Annual Exam       SUBJECTIVE:     Justin Grijalva is a very pleasant 30 y.o. male with hx of diet controlled hypertension, myocarditis, borderline hyperlipidemia and thrombocytopenia-previously worked up by hematology , seen today in office accompanied by his wife for follow-up for lab results review.  He is also due for his annual medical physical.    Patient reports he has not been as strict with his dietary and lifestyle modification.  He has noticed a slight elevation in his blood pressure reading.  He also noticed an increase of cholesterol readings with recent lab draws.  He has since made some changes with diet and lifestyle.  He and his wife has joined a gym and will be exercising more often.    He is a never smoker.  No alcohol use except for rare once or twice a year alcohol consumption for an occasion.  And no illicit drug use.    He does endorse having intermittent chest pain to left sided chest for the last month or so.  Described as more of a dull achy discomfort that usually exacerbate whenever he eats.  He reports that the symptom has resolved a week ago after he made dietary changes and eating healthier options.  He reports no dizziness, no shortness of breath, no palpitation, no unilateral or focal weakness, no tremors, no tics, no diaphoresis.  He reports no melena, no hematochezia no hematemesis, no hemoptysis, no hematuria no incontinence of bladder or bowel function.    He also has not been sleeping much except for a few hours per night.  He tends to stay up late into 1 to 2 AM and wake up around 4 AM.  He does not snore and no wake up gasping or choking.    Past Medical History, Past Surgical History, Family history, Social History, and Medications were all reviewed with the patient today and updated as necessary.       Current Outpatient Medications   Medication Sig Dispense Refill    vitamin D (ERGOCALCIFEROL) 1.25 MG

## 2025-01-31 ENCOUNTER — TELEMEDICINE (OUTPATIENT)
Dept: FAMILY MEDICINE CLINIC | Facility: CLINIC | Age: 30
End: 2025-01-31
Payer: COMMERCIAL

## 2025-01-31 DIAGNOSIS — J01.90 ACUTE BACTERIAL SINUSITIS: Primary | ICD-10-CM

## 2025-01-31 DIAGNOSIS — J40 BRONCHITIS: ICD-10-CM

## 2025-01-31 DIAGNOSIS — B96.89 ACUTE BACTERIAL SINUSITIS: Primary | ICD-10-CM

## 2025-01-31 PROBLEM — Z86.16 HISTORY OF COVID-19: Status: RESOLVED | Noted: 2022-04-29 | Resolved: 2025-01-31

## 2025-01-31 PROCEDURE — 99213 OFFICE O/P EST LOW 20 MIN: CPT | Performed by: NURSE PRACTITIONER

## 2025-07-14 LAB
CHOLEST SERPL-MCNC: 240 MG/DL (ref 0–200)
GLUCOSE SERPL-MCNC: 99 MG/DL (ref 70–99)
HDLC SERPL-MCNC: 54 MG/DL (ref 40–60)
LDLC SERPL CALC-MCNC: 162 MG/DL (ref 0–100)
TRIGL SERPL-MCNC: 117 MG/DL (ref 0–150)

## 2025-08-14 ENCOUNTER — OFFICE VISIT (OUTPATIENT)
Dept: FAMILY MEDICINE CLINIC | Facility: CLINIC | Age: 30
End: 2025-08-14
Payer: COMMERCIAL

## 2025-08-14 VITALS
HEIGHT: 74 IN | WEIGHT: 255.4 LBS | BODY MASS INDEX: 32.78 KG/M2 | DIASTOLIC BLOOD PRESSURE: 86 MMHG | SYSTOLIC BLOOD PRESSURE: 130 MMHG

## 2025-08-14 DIAGNOSIS — Z86.79 HISTORY OF MYOCARDITIS: ICD-10-CM

## 2025-08-14 DIAGNOSIS — R03.0 ELEVATED BLOOD PRESSURE READING WITHOUT DIAGNOSIS OF HYPERTENSION: ICD-10-CM

## 2025-08-14 DIAGNOSIS — R07.9 CHEST PAIN, UNSPECIFIED TYPE: Primary | ICD-10-CM

## 2025-08-14 LAB
ALBUMIN SERPL-MCNC: 3.9 G/DL (ref 3.5–5)
ALBUMIN/GLOB SERPL: 1.1 (ref 1–1.9)
ALP SERPL-CCNC: 56 U/L (ref 40–129)
ALT SERPL-CCNC: 33 U/L (ref 8–55)
ANION GAP SERPL CALC-SCNC: 11 MMOL/L (ref 7–16)
AST SERPL-CCNC: 27 U/L (ref 15–37)
BILIRUB SERPL-MCNC: 1 MG/DL (ref 0–1.2)
BUN SERPL-MCNC: 18 MG/DL (ref 6–23)
CALCIUM SERPL-MCNC: 9.9 MG/DL (ref 8.8–10.2)
CHLORIDE SERPL-SCNC: 103 MMOL/L (ref 98–107)
CO2 SERPL-SCNC: 27 MMOL/L (ref 20–29)
CREAT SERPL-MCNC: 1.25 MG/DL (ref 0.8–1.3)
CRP SERPL-MCNC: <0.3 MG/DL (ref 0–0.4)
ERYTHROCYTE [SEDIMENTATION RATE] IN BLOOD: <1 MM/HR (ref 0–20)
GLOBULIN SER CALC-MCNC: 3.5 G/DL (ref 2.3–3.5)
GLUCOSE SERPL-MCNC: 97 MG/DL (ref 70–99)
GRANS ABSOLUTE, POC: 3.1 K/UL
GRANULOCYTES %, POC: 51.2 %
HEMATOCRIT, POC: 43.1 %
HEMOGLOBIN, POC: 14.5 G/DL
LYMPHOCYTE %, POC: 37.8 %
LYMPHS ABSOLUTE, POC: 2.3 K/UL
MAGNESIUM SERPL-MCNC: 2.2 MG/DL (ref 1.8–2.4)
MCH, POC: 30.2 PG (ref 20–?)
MCHC, POC: 33.6
MCV, POC: 89.8
MONOCYTE %, POC: 11 %
MONOCYTE, ABSOLUTE POC: 0.7 K/UL
MPV, POC: 8.1 FL
PLATELET COUNT, POC: 306 K/UL
POTASSIUM SERPL-SCNC: 4.3 MMOL/L (ref 3.5–5.1)
PROT SERPL-MCNC: 7.4 G/DL (ref 6.3–8.2)
RBC, POC: 4.8 M/UL
RDW, POC: 14.2 %
SODIUM SERPL-SCNC: 141 MMOL/L (ref 136–145)
TSH W FREE THYROID IF ABNORMAL: 1.44 UIU/ML (ref 0.27–4.2)
WBC, POC: 6 K/UL

## 2025-08-14 PROCEDURE — 3075F SYST BP GE 130 - 139MM HG: CPT | Performed by: NURSE PRACTITIONER

## 2025-08-14 PROCEDURE — 3079F DIAST BP 80-89 MM HG: CPT | Performed by: NURSE PRACTITIONER

## 2025-08-14 PROCEDURE — 99214 OFFICE O/P EST MOD 30 MIN: CPT | Performed by: NURSE PRACTITIONER

## 2025-08-14 PROCEDURE — 85025 COMPLETE CBC W/AUTO DIFF WBC: CPT | Performed by: NURSE PRACTITIONER

## 2025-08-14 PROCEDURE — 93000 ELECTROCARDIOGRAM COMPLETE: CPT | Performed by: NURSE PRACTITIONER

## 2025-08-14 RX ORDER — PREDNISONE 20 MG/1
TABLET ORAL
Qty: 11 TABLET | Refills: 0 | Status: SHIPPED | OUTPATIENT
Start: 2025-08-14

## 2025-08-14 RX ORDER — OMEPRAZOLE 40 MG/1
40 CAPSULE, DELAYED RELEASE ORAL
Qty: 30 CAPSULE | Refills: 5 | Status: SHIPPED | OUTPATIENT
Start: 2025-08-14

## 2025-08-14 ASSESSMENT — ENCOUNTER SYMPTOMS
TROUBLE SWALLOWING: 0
BLOOD IN STOOL: 0
EYE PAIN: 0
CHOKING: 0
PHOTOPHOBIA: 0
VOICE CHANGE: 0
BACK PAIN: 0
CONSTIPATION: 0
SINUS PAIN: 0
RHINORRHEA: 0
EYE ITCHING: 0
EYE REDNESS: 0
GASTROINTESTINAL NEGATIVE: 1
FACIAL SWELLING: 0
SINUS PRESSURE: 0
ALLERGIC/IMMUNOLOGIC NEGATIVE: 1
CHEST TIGHTNESS: 0
WHEEZING: 0
ANAL BLEEDING: 0
SHORTNESS OF BREATH: 0
COUGH: 0
DIARRHEA: 0
RESPIRATORY NEGATIVE: 1
ABDOMINAL DISTENTION: 0
EYES NEGATIVE: 1
ABDOMINAL PAIN: 0
RECTAL PAIN: 0
NAUSEA: 0
EYE DISCHARGE: 0
SORE THROAT: 0
APNEA: 0
COLOR CHANGE: 0
STRIDOR: 0
VOMITING: 0

## 2025-08-14 ASSESSMENT — PATIENT HEALTH QUESTIONNAIRE - PHQ9
SUM OF ALL RESPONSES TO PHQ QUESTIONS 1-9: 0
2. FEELING DOWN, DEPRESSED OR HOPELESS: NOT AT ALL
SUM OF ALL RESPONSES TO PHQ QUESTIONS 1-9: 0
1. LITTLE INTEREST OR PLEASURE IN DOING THINGS: NOT AT ALL

## (undated) DEVICE — CATHETER DIAG AD 5FR L110CM 145DEG COR GRY HYDRPHLC NYL

## (undated) DEVICE — RADIFOCUS OPTITORQUE ANGIOGRAPHIC CATHETER: Brand: OPTITORQUE

## (undated) DEVICE — GUIDEWIRE VASC L260CM DIA0.035IN RAD 3MM J TIP L7CM PTFE

## (undated) DEVICE — GLIDESHEATH SLENDER STAINLESS STEEL KIT: Brand: GLIDESHEATH SLENDER

## (undated) DEVICE — DEVICE COMPR REG 24 CM VASC BND